# Patient Record
Sex: FEMALE | Race: WHITE | URBAN - METROPOLITAN AREA
[De-identification: names, ages, dates, MRNs, and addresses within clinical notes are randomized per-mention and may not be internally consistent; named-entity substitution may affect disease eponyms.]

---

## 2017-02-15 ENCOUNTER — TRANSFERRED RECORDS (OUTPATIENT)
Dept: HEALTH INFORMATION MANAGEMENT | Facility: CLINIC | Age: 32
End: 2017-02-15

## 2017-02-16 ENCOUNTER — TRANSFERRED RECORDS (OUTPATIENT)
Dept: HEALTH INFORMATION MANAGEMENT | Facility: CLINIC | Age: 32
End: 2017-02-16

## 2017-02-17 ENCOUNTER — TRANSFERRED RECORDS (OUTPATIENT)
Dept: HEALTH INFORMATION MANAGEMENT | Facility: CLINIC | Age: 32
End: 2017-02-17

## 2017-02-23 ENCOUNTER — ONCOLOGY VISIT (OUTPATIENT)
Dept: ONCOLOGY | Facility: CLINIC | Age: 32
End: 2017-02-23
Attending: OBSTETRICS & GYNECOLOGY
Payer: COMMERCIAL

## 2017-02-23 VITALS
RESPIRATION RATE: 17 BRPM | WEIGHT: 146.4 LBS | SYSTOLIC BLOOD PRESSURE: 122 MMHG | BODY MASS INDEX: 24.39 KG/M2 | OXYGEN SATURATION: 100 % | HEART RATE: 77 BPM | HEIGHT: 65 IN | TEMPERATURE: 98.1 F | DIASTOLIC BLOOD PRESSURE: 83 MMHG

## 2017-02-23 DIAGNOSIS — Z01.818 PRE-OPERATIVE EXAMINATION: ICD-10-CM

## 2017-02-23 DIAGNOSIS — R19.00 PELVIC MASS: ICD-10-CM

## 2017-02-23 DIAGNOSIS — Z01.818 PRE-OPERATIVE EXAMINATION: Primary | ICD-10-CM

## 2017-02-23 LAB
ALBUMIN SERPL-MCNC: 3.9 G/DL (ref 3.4–5)
ALP SERPL-CCNC: 71 U/L (ref 40–150)
ALT SERPL W P-5'-P-CCNC: 47 U/L (ref 0–50)
ANION GAP SERPL CALCULATED.3IONS-SCNC: 8 MMOL/L (ref 3–14)
AST SERPL W P-5'-P-CCNC: 49 U/L (ref 0–45)
BASOPHILS # BLD AUTO: 0 10E9/L (ref 0–0.2)
BASOPHILS NFR BLD AUTO: 0.7 %
BILIRUB SERPL-MCNC: 0.2 MG/DL (ref 0.2–1.3)
BUN SERPL-MCNC: 9 MG/DL (ref 7–30)
CALCIUM SERPL-MCNC: 9.6 MG/DL (ref 8.5–10.1)
CANCER AG125 SERPL-ACNC: 84 U/ML (ref 0–30)
CHLORIDE SERPL-SCNC: 107 MMOL/L (ref 94–109)
CO2 SERPL-SCNC: 26 MMOL/L (ref 20–32)
CREAT SERPL-MCNC: 0.74 MG/DL (ref 0.52–1.04)
DIFFERENTIAL METHOD BLD: ABNORMAL
EOSINOPHIL # BLD AUTO: 0.1 10E9/L (ref 0–0.7)
EOSINOPHIL NFR BLD AUTO: 1.3 %
ERYTHROCYTE [DISTWIDTH] IN BLOOD BY AUTOMATED COUNT: 16.1 % (ref 10–15)
GFR SERPL CREATININE-BSD FRML MDRD: ABNORMAL ML/MIN/1.7M2
GLUCOSE SERPL-MCNC: 72 MG/DL (ref 70–99)
HCT VFR BLD AUTO: 36.8 % (ref 35–47)
HGB BLD-MCNC: 12.3 G/DL (ref 11.7–15.7)
IMM GRANULOCYTES # BLD: 0 10E9/L (ref 0–0.4)
IMM GRANULOCYTES NFR BLD: 0.5 %
LYMPHOCYTES # BLD AUTO: 1.1 10E9/L (ref 0.8–5.3)
LYMPHOCYTES NFR BLD AUTO: 19.7 %
MCH RBC QN AUTO: 27.3 PG (ref 26.5–33)
MCHC RBC AUTO-ENTMCNC: 33.4 G/DL (ref 31.5–36.5)
MCV RBC AUTO: 82 FL (ref 78–100)
MONOCYTES # BLD AUTO: 0.4 10E9/L (ref 0–1.3)
MONOCYTES NFR BLD AUTO: 7 %
NEUTROPHILS # BLD AUTO: 3.9 10E9/L (ref 1.6–8.3)
NEUTROPHILS NFR BLD AUTO: 70.8 %
NRBC # BLD AUTO: 0 10*3/UL
NRBC BLD AUTO-RTO: 0 /100
PLATELET # BLD AUTO: 279 10E9/L (ref 150–450)
POTASSIUM SERPL-SCNC: 4 MMOL/L (ref 3.4–5.3)
PROT SERPL-MCNC: 8 G/DL (ref 6.8–8.8)
RBC # BLD AUTO: 4.5 10E12/L (ref 3.8–5.2)
SODIUM SERPL-SCNC: 141 MMOL/L (ref 133–144)
WBC # BLD AUTO: 5.5 10E9/L (ref 4–11)

## 2017-02-23 PROCEDURE — 99205 OFFICE O/P NEW HI 60 MIN: CPT | Mod: ZP | Performed by: OBSTETRICS & GYNECOLOGY

## 2017-02-23 PROCEDURE — 86304 IMMUNOASSAY TUMOR CA 125: CPT | Performed by: OBSTETRICS & GYNECOLOGY

## 2017-02-23 PROCEDURE — 99212 OFFICE O/P EST SF 10 MIN: CPT | Mod: ZF

## 2017-02-23 PROCEDURE — 80053 COMPREHEN METABOLIC PANEL: CPT | Performed by: OBSTETRICS & GYNECOLOGY

## 2017-02-23 PROCEDURE — 36415 COLL VENOUS BLD VENIPUNCTURE: CPT | Performed by: OBSTETRICS & GYNECOLOGY

## 2017-02-23 PROCEDURE — 85025 COMPLETE CBC W/AUTO DIFF WBC: CPT | Performed by: OBSTETRICS & GYNECOLOGY

## 2017-02-23 RX ORDER — PHENAZOPYRIDINE HYDROCHLORIDE 200 MG/1
200 TABLET, FILM COATED ORAL ONCE
Status: CANCELLED | OUTPATIENT
Start: 2017-02-23 | End: 2017-02-23

## 2017-02-23 RX ORDER — NORETHINDRONE ACETATE AND ETHINYL ESTRADIOL 1MG-20(21)
KIT ORAL
COMMUNITY
Start: 2017-02-17

## 2017-02-23 RX ORDER — IMIQUIMOD 12.5 MG/.25G
CREAM TOPICAL
COMMUNITY
Start: 2017-02-17

## 2017-02-23 RX ORDER — ACETAMINOPHEN 325 MG/1
975 TABLET ORAL ONCE
Status: CANCELLED | OUTPATIENT
Start: 2017-02-23 | End: 2017-02-23

## 2017-02-23 ASSESSMENT — PAIN SCALES - GENERAL: PAINLEVEL: NO PAIN (0)

## 2017-02-23 NOTE — PROGRESS NOTES
"                        Consult Notes on Referred Patient    Date: 2017       Dr. Shira Carranza MD, MD  [address] [address]       RE: Paula Esparza  : 1985  HADLEY: 2017     Dear Dr. Shira Carranza MD:    I had the pleasure of seeing your patient Paula Esparza here at the Gynecologic Cancer Clinic at the [facility] on 2017.  As you know she is a very pleasant 31 year old woman with a recent diagnosis of pelvic mass.  Given these findings she was subsequently sent to the Gynecologic Cancer Clinic for new patient consultation.     The patient states she was seen at Urgent care on 2/15/17 for concerns of progressively worsening LUQ abdominal pain over the week prior.  She states she was working a lot of hours (as a ) and thought it was secondary to a muscle strain and/or constipation.  At Urgent care, they told her it appeared she was constipation, however, noted a very large abdominal mass.  She was subsequently referred to [facility] for further evaluation.  There, she had CT scan and laboratory testing completed which was notable for an elevated CA-125 an 24 cm pelvic mass extending to the upper abdomen. She followed up with benign OB/GYN at which time pap smear was obtained then subsequently referred to us.  Today the patient reports minimal abdominal pressure but no pain.  States she otherwise feels well and at her baseline.     OB History:       GYN History:   LMP: 17  Menses: \"pretty regular\". Lasts 4 days, normal flow. Minimal to moderate cramping.   Sexual Activity: Not currently.  Male partners only.   Contraception: Denies   STI: Denies   Hx of genital warts; has used Aldara in the past.  Got a new Rx but has not filled it.     Brief History:  2017: CT for abdominal pain (LUQ)  Impression:   1. Massive 24.8 x 15.3 x 24.3 cm unilocular fluid-filled lesion extending   from the pelvis into the superior abdomen, with an " single peripheral   enhancing mural nodule. 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 Relative hyperintensity of the   internal fluid may represent proteinaceous fluid, debris, or blood   products; no active hemorrhage is identified.   2. 3.1 x 7.1 x 7.3 well circumscribed fat containing lesion along the   left/superior aspect of the lesion, may represent a co-existent teratoma   that has been displaced out of the pelvis as a result of the ovarian   Lesion.  CA-125 on 2/17/17: 123     Review of Systems:  Systemic           No weight changes; no night sweats; no appetite changes (inconsistent at baseline)  Skin           No rashes, or lesions  Eye           no changes in vision  Joaquina-Laryngeal           no hoarseness   Pulmonary    no cough; no shortness of breath  Cardiovascular    no chest pain  Gastrointestinal    no diarrhea; no constipation;  no blood in stool  Genitourinary   urinary frequency (10-15 times day); no urinary urgency; no dysuria; no pain; no abnormal vaginal discharge; no abnormal vaginal bleeding  Breast    no breast changes; no breast pain  Musculoskeletal    no myalgias; no arthralgias  Psychiatric           depressed mood (denies every being on medication); no anxiety    Hematologic           no noticeable swellings or lumps   Endocrine    no hot flashes; no heat/cold intolerance         Neurological   no numbness and tingling    Past Medical History:  Past Medical History   Diagnosis Date     Depression      Not formally diagnosed     Past Surgical History:  History reviewed. No pertinent past surgical history.    Health Maintenance:  Health Maintenance Due   Topic Date Due     TETANUS IMMUNIZATION (SYSTEM ASSIGNED)  08/02/2005     PAP SCREENING Q3 YR (SYSTEM ASSIGNED)  08/02/2008     INFLUENZA VACCINE (SYSTEM ASSIGNED)  09/01/2016     Last Pap Smear: 02/2017   Result: ASC-US, unable to see HPV results in Care Everywhere  She has not had a history of  "abnormal Pap smears in the past.    Last Mammogram: Denies  Last Colonoscopy: Denies  Last DEXA Scan: Denies                Last Diabetes Screening:  Denies   Last Thyroid Screening:   Denies   Last Cholest Screening:  Denies     Current Medications:   has a current medication list which includes the following prescription(s): imiquimod and norethindrone-ethinyl estradiol.     Allergies:   No Known Allergies      Social History:  Lives with roommate.  Works at [employer] as a .  Current tobacco use is 1 PPD for past 7 years.  ETOH use: drinks 5-7 beverages, patient aware that she drinks too much. Denies illicit drug use.     Family History:   Family History   Problem Relation Age of Onset     CANCER Mother      Tongue      Coronary Artery Disease Early Onset Father      DIABETES Father      Breast Cancer Maternal Grandmother      Survivor     Coronary Artery Disease Maternal Grandmother      Breast Cancer Paternal Grandmother      Suvivor    Sister with ovarian cysts      Physical Exam:   /83 (BP Location: Right arm, Patient Position: Chair, Cuff Size: Adult Regular)  Pulse 77  Temp 98.1  F (36.7  C) (Oral)  Resp 17  Ht 1.638 m (5' 4.5\")  Wt 66.4 kg (146 lb 6.4 oz)  LMP 02/21/2017  SpO2 100%  Breastfeeding? No  BMI 24.74 kg/m2  Body mass index is 24.74 kg/(m^2).    General Appearance: healthy and alert, no distress     HEENT:  no thyromegaly, no palpable nodules or masses        Cardiovascular: regular rate and rhythm, no gallops, rubs or murmurs     Respiratory: lungs clear, no rales, rhonchi or wheezes, normal diaphragmatic excursion    Musculoskeletal:  extremities non tender and without edema    Skin: no lesions or rashes     Neurological: normal gait, no gross defects     Psychiatric: appropriate mood and affect                               Hematological: normal cervical, supraclavicular and inguinal lymph nodes     Gastrointestinal:     abdomen soft, non-tender. Palpable fullness above umbilicus " to right side of abdomen.    Genitourinary: External genitalia and urethral meatus appears normal except for areas along right introitus and left consistent with condyloma. Vagina is smooth without nodularity or masses.  Cervix appears normal and without lesions.  Menstruating currently. Bimanual exam reveals small midline uterus, fullness on rectovaginal exam, no nodularity or fullness.  Recto-vaginal exam confirms these findings.      Assessment:  Paula Esparza is a 31 year old woman with a new diagnosis of large 25 cm pelvic mass, concern for ovarian etiology with CA-125 of 123.         Plan:     1.)   Pelvic mass.  Discussed recommendations for surgical resection by way of exploratory laparotomy, Topel of mass, oophorectomy, possible pelvic and paraaortic lymph node dissection, possible cancer staging.  Also discussed pending HPV results on her most recent ASC-US pap smear, recommendations for possible colposcopy, possible cervical biopsies.       2.) Genetic risk factors were assessed and the patient does not meet the qualifications for a referral.      3.) Labs and/or tests ordered include:  CMP, CBC.  Need to follow up on HPV testing (from pap smear obtained on 2/17/17) at [facility] to determine need for colposcopy with biopsies given ASC-US pap.      4.) Health maintenance issues addressed today include Depression and ETOH abuse.  SW consulted to assist with resource management after patient was amenable and open to further discussion.     5.) Pre-op teaching was completed today.  Risks of surgery were discussed to include: bleeding, transfusion, infection, unintentional injury to surrounding organs/structures.      Thank you for allowing us to participate in the care of your patient.         Sincerely,    Haley Fernandez MD    Department of Ob/Gyn and Women's Health  Division of Gynecologic Oncology  [facility]  [phone]      CC  No care team member to  LIOR Vivas MD

## 2017-02-23 NOTE — LETTER
"2017       RE: Paula Esparza  [address] [facility] [address]     Dear Colleague,    Thank you for referring your patient, Paula Esparza, to the [facility]. Please see a copy of my visit note below.                            Consult Notes on Referred Patient    Date: 2017       Dr. Shira Carranza MD, MD [facility]  [address] [place] [address]       RE: Paula Esparza  : 1985  HADLEY: 2017     Dear Dr. Shira Carranza MD:    I had the pleasure of seeing your patient Paula Esparza here at the Gynecologic Cancer Clinic at the [facility] on 2017.  As you know she is a very pleasant 31 year old woman with a recent diagnosis of pelvic mass.  Given these findings she was subsequently sent to the Gynecologic Cancer Clinic for new patient consultation.     The patient states she was seen at Urgent care on 2/15/17 for concerns of progressively worsening LUQ abdominal pain over the week prior.  She states she was working a lot of hours (as a ) and thought it was secondary to a muscle strain and/or constipation.  At Urgent care, they told her it appeared she was constipation, however, noted a very large abdominal mass.  She was subsequently referred to [facility] for further evaluation.  There, she had CT scan and laboratory testing completed which was notable for an elevated CA-125 an 24 cm pelvic mass extending to the upper abdomen. She followed up with benign OB/GYN at which time pap smear was obtained then subsequently referred to us.  Today the patient reports minimal abdominal pressure but no pain.  States she otherwise feels well and at her baseline.     OB History:       GYN History:   LMP: 17  Menses: \"pretty regular\". Lasts 4 days, normal flow. Minimal to moderate cramping.   Sexual Activity: Not currently.  Male partners only.   Contraception: Denies   STI: Denies   Hx of genital warts; has used Aldara in the past.  " Got a new Rx but has not filled it.     Brief History:  2/2017: CT for abdominal pain (LUQ)  Impression:   1. Massive 24.8 x 15.3 x 24.3 cm unilocular fluid-filled lesion extending   from the pelvis into the superior abdomen, with an single peripheral   enhancing mural nodule. 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 Relative hyperintensity of the   internal fluid may represent proteinaceous fluid, debris, or blood   products; no active hemorrhage is identified.   2. 3.1 x 7.1 x 7.3 well circumscribed fat containing lesion along the   left/superior aspect of the lesion, may represent a co-existent teratoma   that has been displaced out of the pelvis as a result of the ovarian   Lesion.  CA-125 on 2/17/17: 123     Review of Systems:  Systemic           No weight changes; no night sweats; no appetite changes (inconsistent at baseline)  Skin           No rashes, or lesions  Eye           no changes in vision  Joaquina-Laryngeal           no hoarseness   Pulmonary    no cough; no shortness of breath  Cardiovascular    no chest pain  Gastrointestinal    no diarrhea; no constipation;  no blood in stool  Genitourinary   urinary frequency (10-15 times day); no urinary urgency; no dysuria; no pain; no abnormal vaginal discharge; no abnormal vaginal bleeding  Breast    no breast changes; no breast pain  Musculoskeletal    no myalgias; no arthralgias  Psychiatric           depressed mood (denies every being on medication); no anxiety    Hematologic           no noticeable swellings or lumps   Endocrine    no hot flashes; no heat/cold intolerance         Neurological   no numbness and tingling    Past Medical History:  Past Medical History   Diagnosis Date     Depression      Not formally diagnosed     Past Surgical History:  History reviewed. No pertinent past surgical history.    Health Maintenance:  Health Maintenance Due   Topic Date Due     TETANUS IMMUNIZATION (SYSTEM ASSIGNED)   "08/02/2005     PAP SCREENING Q3 YR (SYSTEM ASSIGNED)  08/02/2008     INFLUENZA VACCINE (SYSTEM ASSIGNED)  09/01/2016     Last Pap Smear: 02/2017   Result: ASC-US, unable to see HPV results in Care Everywhere  She has not had a history of abnormal Pap smears in the past.    Last Mammogram: Denies  Last Colonoscopy: Denies  Last DEXA Scan: Denies                Last Diabetes Screening:  Denies   Last Thyroid Screening:   Denies   Last Cholest Screening:  Denies     Current Medications:   has a current medication list which includes the following prescription(s): imiquimod and norethindrone-ethinyl estradiol.     Allergies:   No Known Allergies      Social History:  Lives with roommate.  Works at [employer] as a .  Current tobacco use is 1 PPD for past 7 years.  ETOH use: drinks 5-7 beverages, patient aware that she drinks too much. Denies illicit drug use.     Family History:   Family History   Problem Relation Age of Onset     CANCER Mother      Tongue      Coronary Artery Disease Early Onset Father      DIABETES Father      Breast Cancer Maternal Grandmother      Survivor     Coronary Artery Disease Maternal Grandmother      Breast Cancer Paternal Grandmother      Suvivor    Sister with ovarian cysts      Physical Exam:   /83 (BP Location: Right arm, Patient Position: Chair, Cuff Size: Adult Regular)  Pulse 77  Temp 98.1  F (36.7  C) (Oral)  Resp 17  Ht 1.638 m (5' 4.5\")  Wt 66.4 kg (146 lb 6.4 oz)  LMP 02/21/2017  SpO2 100%  Breastfeeding? No  BMI 24.74 kg/m2  Body mass index is 24.74 kg/(m^2).    General Appearance: healthy and alert, no distress     HEENT:  no thyromegaly, no palpable nodules or masses        Cardiovascular: regular rate and rhythm, no gallops, rubs or murmurs     Respiratory: lungs clear, no rales, rhonchi or wheezes, normal diaphragmatic excursion    Musculoskeletal:  extremities non tender and without edema    Skin: no lesions or rashes     Neurological: normal gait, no gross " defects     Psychiatric: appropriate mood and affect                               Hematological: normal cervical, supraclavicular and inguinal lymph nodes     Gastrointestinal:     abdomen soft, non-tender. Palpable fullness above umbilicus to right side of abdomen.    Genitourinary: External genitalia and urethral meatus appears normal except for areas along right introitus and left consistent with condyloma. Vagina is smooth without nodularity or masses.  Cervix appears normal and without lesions.  Menstruating currently. Bimanual exam reveals small midline uterus, fullness on rectovaginal exam, no nodularity or fullness.  Recto-vaginal exam confirms these findings.      Assessment:  Paula Esparza is a 31 year old woman with a new diagnosis of large 25 cm pelvic mass, concern for ovarian etiology with CA-125 of 123.         Plan:     1.)   Pelvic mass.  Discussed recommendations for surgical resection by way of exploratory laparotomy, Topel of mass, oophorectomy, possible pelvic and paraaortic lymph node dissection, possible cancer staging.  Also discussed pending HPV results on her most recent ASC-US pap smear, recommendations for possible colposcopy, possible cervical biopsies.       2.) Genetic risk factors were assessed and the patient does not meet the qualifications for a referral.      3.) Labs and/or tests ordered include:  CMP, CBC.  Need to follow up on HPV testing (from pap smear obtained on 2/17/17) at [facility] [place] [place] to determine need for colposcopy with biopsies given ASC-US pap.      4.) Health maintenance issues addressed today include Depression and ETOH abuse.  SW consulted to assist with resource management after patient was amenable and open to further discussion.     5.) Pre-op teaching was completed today.  Risks of surgery were discussed to include: bleeding, transfusion, infection, unintentional injury to surrounding organs/structures.      Thank you for allowing us to participate in the care of your  patient.         Sincerely,    Haley Fernandez MD    Department of Ob/Gyn and Women's Health  Division of Gynecologic Oncology  [facility]  [phone]      CC  No care team member to LIOR Vivas MD

## 2017-02-23 NOTE — MR AVS SNAPSHOT
"              After Visit Summary   2/23/2017    Paula Esparza    MRN: [id number]           Patient Information     Date Of Birth          1985        Visit Information        Provider Department      2/23/2017 10:00 Haley Daily MD [facility]        Today's Diagnoses     Pre-operative examination    -  1    Pelvic mass           Follow-ups after your visit        Your next 10 appointments already scheduled     Mar 01, 2017   Procedure with Haley Fernandez MD   [facility] [place], Same Day Surgery (--)    [address] [address] [phone]            Mar 30, 2017  2:00 PM CDT   (Arrive by 1:45 PM)   Post-Op with Haley Fernandez MD   [facility])    [address] [facility] [address] [phone]              Who to contact     If you have questions or need follow up information about today's clinic visit or your schedule please contact [facility] directly at [phone].  Normal or non-critical lab and imaging results will be communicated to you by [employer], letter or phone within 4 business days after the clinic has received the results. If you do not hear from us within 7 days, please contact the clinic through [employer] or phone. If you have a critical or abnormal lab result, we will notify you by phone as soon as possible.  Submit refill requests through [employer] or call your pharmacy and they will forward the refill request to us. Please allow 3 business days for your refill to be completed.          Additional Information About Your Visit        [employer] Information     [employer] lets you send messages to your doctor, view your test results, renew your prescriptions, schedule appointments and more. To sign up, go to [email or web address] . Click on \"Log in\" on the left side of the screen, which will take you to the Welcome page. Then click on \"Sign up Now\" on " "the right side of the page.     You will be asked to enter the access code listed below, as well as some personal information. Please follow the directions to create your username and password.     Your access code is: [email or web address]  Expires: 2017  1:49 PM     Your access code will  in 90 days. If you need help or a new code, please call your [facility] or [phone].        Care EveryWhere ID     This is your Care EveryWhere ID. This could be used by other organizations to access your [facility] medical records  [id number]        Your Vitals Were     Pulse Temperature Respirations Height Last Period Pulse Oximetry    77 98.1  F (36.7  C) (Oral) 17 1.638 m (5' 4.5\") 2017 100%    Breastfeeding? BMI (Body Mass Index)                No 24.74 kg/m2           Blood Pressure from Last 3 Encounters:   17 122/83    Weight from Last 3 Encounters:   17 66.4 kg (146 lb 6.4 oz)              We Performed the Following     Kassandra-Operative Worksheet - Exploratory Laparotomy Total Abdominal Hysterectomy, bilateral Salpingo-Oophorectomy, tumor debulking, omentectomy, possible intraperitoneal port placement        Primary Care Provider    None Specified       No primary provider on file.        Thank you!     Thank you for choosing [facility]CER [facility]  for your care. Our goal is always to provide you with excellent care. Hearing back from our patients is one way we can continue to improve our services. Please take a few minutes to complete the written survey that you may receive in the mail after your visit with us. Thank you!             Your Updated Medication List - Protect others around you: Learn how to safely use, store and throw away your medicines at www.disposemymeds.org.          This list is accurate as of: 17 11:59 PM.  Always use your most recent med list.                   Brand Name Dispense Instructions for use    imiquimod 5 % cream    ALDARA     Apply to affected " areas qOD x 6 weeks.       norethindrone-ethinyl estradiol 1-20 MG-MCG per tablet    JUNEL FE 1/20

## 2017-02-23 NOTE — NURSING NOTE
"Paula Esparza is a 31 year old female who presents for:  Chief Complaint   Patient presents with     Oncology Clinic Visit     new patient consultation related to ovary mass         Initial Vitals:  /83 (BP Location: Right arm, Patient Position: Chair, Cuff Size: Adult Regular)  Pulse 77  Temp 98.1  F (36.7  C) (Oral)  Resp 17  Ht 1.638 m (5' 4.5\")  Wt 66.4 kg (146 lb 6.4 oz)  LMP 02/21/2017  SpO2 100%  Breastfeeding? No  BMI 24.74 kg/m2 Estimated body mass index is 24.74 kg/(m^2) as calculated from the following:    Height as of this encounter: 1.638 m (5' 4.5\").    Weight as of this encounter: 66.4 kg (146 lb 6.4 oz).. Body surface area is 1.74 meters squared. BP completed using cuff size: regular  No Pain (0) Patient's last menstrual period was 02/21/2017. Allergies and medications reviewed.     Medications: Medication refills not needed today.  Pharmacy name entered into EPIC: Data Unavailable    Comments: patient denied pain/discomfort.     5 minutes for nursing intake (face to face time)   Hua Albrecht CMA        "

## 2017-02-28 ENCOUNTER — ANESTHESIA EVENT (OUTPATIENT)
Dept: SURGERY | Facility: CLINIC | Age: 32
DRG: 743 | End: 2017-02-28
Payer: COMMERCIAL

## 2017-02-28 ASSESSMENT — ACTIVITIES OF DAILY LIVING (ADL): COGNITION: 0 - NO COGNITION ISSUES REPORTED

## 2017-03-01 ENCOUNTER — ANESTHESIA (OUTPATIENT)
Dept: SURGERY | Facility: CLINIC | Age: 32
DRG: 743 | End: 2017-03-01
Payer: COMMERCIAL

## 2017-03-01 ENCOUNTER — HOSPITAL ENCOUNTER (INPATIENT)
Facility: CLINIC | Age: 32
LOS: 2 days | Discharge: HOME | DRG: 743 | End: 2017-03-03
Attending: OBSTETRICS & GYNECOLOGY | Admitting: OBSTETRICS & GYNECOLOGY
Payer: COMMERCIAL

## 2017-03-01 DIAGNOSIS — D36.9 DERMOID CYST: Primary | ICD-10-CM

## 2017-03-01 DIAGNOSIS — R19.00 PELVIC MASS: ICD-10-CM

## 2017-03-01 DIAGNOSIS — Z01.818 PRE-OPERATIVE EXAMINATION: ICD-10-CM

## 2017-03-01 DIAGNOSIS — G89.18 ACUTE POST-OPERATIVE PAIN: ICD-10-CM

## 2017-03-01 LAB
ABO + RH BLD: NORMAL
ABO + RH BLD: NORMAL
BLD GP AB SCN SERPL QL: NORMAL
BLOOD BANK CMNT PATIENT-IMP: NORMAL
HCG UR QL: NEGATIVE
SPECIMEN EXP DATE BLD: NORMAL

## 2017-03-01 PROCEDURE — 0UT50ZZ RESECTION OF RIGHT FALLOPIAN TUBE, OPEN APPROACH: ICD-10-PCS | Performed by: OBSTETRICS & GYNECOLOGY

## 2017-03-01 PROCEDURE — 25000125 ZZHC RX 250: Performed by: ANESTHESIOLOGY

## 2017-03-01 PROCEDURE — 25000125 ZZHC RX 250: Performed by: NURSE ANESTHETIST, CERTIFIED REGISTERED

## 2017-03-01 PROCEDURE — 88112 CYTOPATH CELL ENHANCE TECH: CPT | Performed by: OBSTETRICS & GYNECOLOGY

## 2017-03-01 PROCEDURE — 00000155 ZZHCL STATISTIC H-CELL BLOCK W/STAIN: Performed by: OBSTETRICS & GYNECOLOGY

## 2017-03-01 PROCEDURE — 36000053 ZZH SURGERY LEVEL 2 EA 15 ADDTL MIN - UMMC: Performed by: OBSTETRICS & GYNECOLOGY

## 2017-03-01 PROCEDURE — 88305 TISSUE EXAM BY PATHOLOGIST: CPT | Performed by: OBSTETRICS & GYNECOLOGY

## 2017-03-01 PROCEDURE — 25000128 H RX IP 250 OP 636: Performed by: NURSE ANESTHETIST, CERTIFIED REGISTERED

## 2017-03-01 PROCEDURE — 86900 BLOOD TYPING SEROLOGIC ABO: CPT | Performed by: OBSTETRICS & GYNECOLOGY

## 2017-03-01 PROCEDURE — 25000132 ZZH RX MED GY IP 250 OP 250 PS 637: Performed by: OBSTETRICS & GYNECOLOGY

## 2017-03-01 PROCEDURE — 25800025 ZZH RX 258: Performed by: OBSTETRICS & GYNECOLOGY

## 2017-03-01 PROCEDURE — 81025 URINE PREGNANCY TEST: CPT | Performed by: OBSTETRICS & GYNECOLOGY

## 2017-03-01 PROCEDURE — 40000014 ZZH STATISTIC ARTERIAL MONITORING DAILY

## 2017-03-01 PROCEDURE — 25000128 H RX IP 250 OP 636: Performed by: ANESTHESIOLOGY

## 2017-03-01 PROCEDURE — 36000051 ZZH SURGERY LEVEL 2 1ST 30 MIN - UMMC: Performed by: OBSTETRICS & GYNECOLOGY

## 2017-03-01 PROCEDURE — 88307 TISSUE EXAM BY PATHOLOGIST: CPT | Performed by: OBSTETRICS & GYNECOLOGY

## 2017-03-01 PROCEDURE — 27210794 ZZH OR GENERAL SUPPLY STERILE: Performed by: OBSTETRICS & GYNECOLOGY

## 2017-03-01 PROCEDURE — 12000025 ZZH R&B TRANSPLANT INTERMEDIATE

## 2017-03-01 PROCEDURE — 88302 TISSUE EXAM BY PATHOLOGIST: CPT | Performed by: OBSTETRICS & GYNECOLOGY

## 2017-03-01 PROCEDURE — 86850 RBC ANTIBODY SCREEN: CPT | Performed by: OBSTETRICS & GYNECOLOGY

## 2017-03-01 PROCEDURE — C9399 UNCLASSIFIED DRUGS OR BIOLOG: HCPCS | Performed by: NURSE ANESTHETIST, CERTIFIED REGISTERED

## 2017-03-01 PROCEDURE — 25000125 ZZHC RX 250: Performed by: OBSTETRICS & GYNECOLOGY

## 2017-03-01 PROCEDURE — 37000008 ZZH ANESTHESIA TECHNICAL FEE, 1ST 30 MIN: Performed by: OBSTETRICS & GYNECOLOGY

## 2017-03-01 PROCEDURE — 40000170 ZZH STATISTIC PRE-PROCEDURE ASSESSMENT II: Performed by: OBSTETRICS & GYNECOLOGY

## 2017-03-01 PROCEDURE — 25800025 ZZH RX 258: Performed by: NURSE ANESTHETIST, CERTIFIED REGISTERED

## 2017-03-01 PROCEDURE — 0UDB8ZZ EXTRACTION OF ENDOMETRIUM, VIA NATURAL OR ARTIFICIAL OPENING ENDOSCOPIC: ICD-10-PCS | Performed by: OBSTETRICS & GYNECOLOGY

## 2017-03-01 PROCEDURE — 71000014 ZZH RECOVERY PHASE 1 LEVEL 2 FIRST HR: Performed by: OBSTETRICS & GYNECOLOGY

## 2017-03-01 PROCEDURE — P9041 ALBUMIN (HUMAN),5%, 50ML: HCPCS | Performed by: NURSE ANESTHETIST, CERTIFIED REGISTERED

## 2017-03-01 PROCEDURE — 0UT00ZZ RESECTION OF RIGHT OVARY, OPEN APPROACH: ICD-10-PCS | Performed by: OBSTETRICS & GYNECOLOGY

## 2017-03-01 PROCEDURE — 37000009 ZZH ANESTHESIA TECHNICAL FEE, EACH ADDTL 15 MIN: Performed by: OBSTETRICS & GYNECOLOGY

## 2017-03-01 PROCEDURE — 71000015 ZZH RECOVERY PHASE 1 LEVEL 2 EA ADDTL HR: Performed by: OBSTETRICS & GYNECOLOGY

## 2017-03-01 PROCEDURE — 25000128 H RX IP 250 OP 636: Performed by: OBSTETRICS & GYNECOLOGY

## 2017-03-01 PROCEDURE — S0020 INJECTION, BUPIVICAINE HYDRO: HCPCS | Performed by: ANESTHESIOLOGY

## 2017-03-01 PROCEDURE — 25000131 ZZH RX MED GY IP 250 OP 636 PS 637: Performed by: ANESTHESIOLOGY

## 2017-03-01 PROCEDURE — 88331 PATH CONSLTJ SURG 1 BLK 1SPC: CPT | Performed by: OBSTETRICS & GYNECOLOGY

## 2017-03-01 PROCEDURE — 25000565 ZZH ISOFLURANE, EA 15 MIN: Performed by: OBSTETRICS & GYNECOLOGY

## 2017-03-01 PROCEDURE — 86901 BLOOD TYPING SEROLOGIC RH(D): CPT | Performed by: OBSTETRICS & GYNECOLOGY

## 2017-03-01 PROCEDURE — 0UBC8ZX EXCISION OF CERVIX, VIA NATURAL OR ARTIFICIAL OPENING ENDOSCOPIC, DIAGNOSTIC: ICD-10-PCS | Performed by: OBSTETRICS & GYNECOLOGY

## 2017-03-01 PROCEDURE — 36415 COLL VENOUS BLD VENIPUNCTURE: CPT | Performed by: OBSTETRICS & GYNECOLOGY

## 2017-03-01 PROCEDURE — 40000275 ZZH STATISTIC RCP TIME EA 10 MIN

## 2017-03-01 RX ORDER — FLUMAZENIL 0.1 MG/ML
0.2 INJECTION, SOLUTION INTRAVENOUS
Status: DISCONTINUED | OUTPATIENT
Start: 2017-03-01 | End: 2017-03-01 | Stop reason: HOSPADM

## 2017-03-01 RX ORDER — HEPARIN SODIUM 5000 [USP'U]/.5ML
5000 INJECTION, SOLUTION INTRAVENOUS; SUBCUTANEOUS ONCE
Status: COMPLETED | OUTPATIENT
Start: 2017-03-01 | End: 2017-03-01

## 2017-03-01 RX ORDER — SCOLOPAMINE TRANSDERMAL SYSTEM 1 MG/1
1 PATCH, EXTENDED RELEASE TRANSDERMAL ONCE
Status: DISCONTINUED | OUTPATIENT
Start: 2017-03-01 | End: 2017-03-01 | Stop reason: HOSPADM

## 2017-03-01 RX ORDER — HYDROXYZINE HYDROCHLORIDE 25 MG/1
25 TABLET, FILM COATED ORAL EVERY 6 HOURS PRN
Status: DISCONTINUED | OUTPATIENT
Start: 2017-03-01 | End: 2017-03-03 | Stop reason: HOSPADM

## 2017-03-01 RX ORDER — FENTANYL CITRATE 50 UG/ML
25-50 INJECTION, SOLUTION INTRAMUSCULAR; INTRAVENOUS
Status: DISCONTINUED | OUTPATIENT
Start: 2017-03-01 | End: 2017-03-01 | Stop reason: HOSPADM

## 2017-03-01 RX ORDER — ONDANSETRON 2 MG/ML
4 INJECTION INTRAMUSCULAR; INTRAVENOUS EVERY 30 MIN PRN
Status: DISCONTINUED | OUTPATIENT
Start: 2017-03-01 | End: 2017-03-01 | Stop reason: HOSPADM

## 2017-03-01 RX ORDER — HYDROMORPHONE HYDROCHLORIDE 1 MG/ML
.3-.5 INJECTION, SOLUTION INTRAMUSCULAR; INTRAVENOUS; SUBCUTANEOUS
Status: DISCONTINUED | OUTPATIENT
Start: 2017-03-01 | End: 2017-03-02

## 2017-03-01 RX ORDER — ACETAMINOPHEN 325 MG/1
650 TABLET ORAL EVERY 6 HOURS
Status: DISCONTINUED | OUTPATIENT
Start: 2017-03-01 | End: 2017-03-01

## 2017-03-01 RX ORDER — BISACODYL 10 MG
10 SUPPOSITORY, RECTAL RECTAL DAILY PRN
Status: DISCONTINUED | OUTPATIENT
Start: 2017-03-01 | End: 2017-03-02

## 2017-03-01 RX ORDER — LIDOCAINE HYDROCHLORIDE 20 MG/ML
INJECTION, SOLUTION INFILTRATION; PERINEURAL PRN
Status: DISCONTINUED | OUTPATIENT
Start: 2017-03-01 | End: 2017-03-01

## 2017-03-01 RX ORDER — FENTANYL CITRATE 50 UG/ML
INJECTION, SOLUTION INTRAMUSCULAR; INTRAVENOUS PRN
Status: DISCONTINUED | OUTPATIENT
Start: 2017-03-01 | End: 2017-03-01

## 2017-03-01 RX ORDER — SODIUM CHLORIDE, SODIUM LACTATE, POTASSIUM CHLORIDE, CALCIUM CHLORIDE 600; 310; 30; 20 MG/100ML; MG/100ML; MG/100ML; MG/100ML
INJECTION, SOLUTION INTRAVENOUS CONTINUOUS PRN
Status: DISCONTINUED | OUTPATIENT
Start: 2017-03-01 | End: 2017-03-01

## 2017-03-01 RX ORDER — ONDANSETRON 4 MG/1
4 TABLET, ORALLY DISINTEGRATING ORAL EVERY 30 MIN PRN
Status: DISCONTINUED | OUTPATIENT
Start: 2017-03-01 | End: 2017-03-01 | Stop reason: HOSPADM

## 2017-03-01 RX ORDER — HYDROMORPHONE HYDROCHLORIDE 1 MG/ML
.3-.5 INJECTION, SOLUTION INTRAMUSCULAR; INTRAVENOUS; SUBCUTANEOUS EVERY 10 MIN PRN
Status: DISCONTINUED | OUTPATIENT
Start: 2017-03-01 | End: 2017-03-01 | Stop reason: HOSPADM

## 2017-03-01 RX ORDER — ACETAMINOPHEN 325 MG/1
650 TABLET ORAL EVERY 6 HOURS
Status: DISCONTINUED | OUTPATIENT
Start: 2017-03-01 | End: 2017-03-03

## 2017-03-01 RX ORDER — OXYCODONE HYDROCHLORIDE 5 MG/1
5-10 TABLET ORAL
Status: DISCONTINUED | OUTPATIENT
Start: 2017-03-01 | End: 2017-03-01

## 2017-03-01 RX ORDER — PHENAZOPYRIDINE HYDROCHLORIDE 200 MG/1
200 TABLET, FILM COATED ORAL ONCE
Status: COMPLETED | OUTPATIENT
Start: 2017-03-01 | End: 2017-03-01

## 2017-03-01 RX ORDER — ALBUMIN, HUMAN INJ 5% 5 %
SOLUTION INTRAVENOUS CONTINUOUS PRN
Status: DISCONTINUED | OUTPATIENT
Start: 2017-03-01 | End: 2017-03-01

## 2017-03-01 RX ORDER — SODIUM CHLORIDE, SODIUM LACTATE, POTASSIUM CHLORIDE, CALCIUM CHLORIDE 600; 310; 30; 20 MG/100ML; MG/100ML; MG/100ML; MG/100ML
INJECTION, SOLUTION INTRAVENOUS CONTINUOUS
Status: DISCONTINUED | OUTPATIENT
Start: 2017-03-01 | End: 2017-03-01 | Stop reason: HOSPADM

## 2017-03-01 RX ORDER — IBUPROFEN 600 MG/1
600 TABLET, FILM COATED ORAL EVERY 6 HOURS
Status: DISCONTINUED | OUTPATIENT
Start: 2017-03-01 | End: 2017-03-03 | Stop reason: HOSPADM

## 2017-03-01 RX ORDER — SODIUM CHLORIDE, SODIUM LACTATE, POTASSIUM CHLORIDE, CALCIUM CHLORIDE 600; 310; 30; 20 MG/100ML; MG/100ML; MG/100ML; MG/100ML
INJECTION, SOLUTION INTRAVENOUS CONTINUOUS
Status: DISCONTINUED | OUTPATIENT
Start: 2017-03-01 | End: 2017-03-02

## 2017-03-01 RX ORDER — PROPOFOL 10 MG/ML
INJECTION, EMULSION INTRAVENOUS PRN
Status: DISCONTINUED | OUTPATIENT
Start: 2017-03-01 | End: 2017-03-01

## 2017-03-01 RX ORDER — ACETAMINOPHEN 325 MG/1
975 TABLET ORAL ONCE
Status: COMPLETED | OUTPATIENT
Start: 2017-03-01 | End: 2017-03-01

## 2017-03-01 RX ORDER — SCOLOPAMINE TRANSDERMAL SYSTEM 1 MG/1
1 PATCH, EXTENDED RELEASE TRANSDERMAL ONCE
Status: COMPLETED | OUTPATIENT
Start: 2017-03-01 | End: 2017-03-01

## 2017-03-01 RX ORDER — ONDANSETRON 4 MG/1
4 TABLET, ORALLY DISINTEGRATING ORAL EVERY 8 HOURS PRN
Status: DISCONTINUED | OUTPATIENT
Start: 2017-03-02 | End: 2017-03-02

## 2017-03-01 RX ORDER — NALOXONE HYDROCHLORIDE 0.4 MG/ML
.1-.4 INJECTION, SOLUTION INTRAMUSCULAR; INTRAVENOUS; SUBCUTANEOUS
Status: DISCONTINUED | OUTPATIENT
Start: 2017-03-01 | End: 2017-03-01 | Stop reason: HOSPADM

## 2017-03-01 RX ORDER — APREPITANT 40 MG/1
40 CAPSULE ORAL DAILY
Status: DISCONTINUED | OUTPATIENT
Start: 2017-03-01 | End: 2017-03-01

## 2017-03-01 RX ORDER — NALOXONE HYDROCHLORIDE 0.4 MG/ML
.1-.4 INJECTION, SOLUTION INTRAMUSCULAR; INTRAVENOUS; SUBCUTANEOUS
Status: DISCONTINUED | OUTPATIENT
Start: 2017-03-01 | End: 2017-03-03 | Stop reason: HOSPADM

## 2017-03-01 RX ORDER — LIDOCAINE 40 MG/G
CREAM TOPICAL
Status: DISCONTINUED | OUTPATIENT
Start: 2017-03-01 | End: 2017-03-03 | Stop reason: HOSPADM

## 2017-03-01 RX ORDER — MEPERIDINE HYDROCHLORIDE 25 MG/ML
12.5 INJECTION INTRAMUSCULAR; INTRAVENOUS; SUBCUTANEOUS
Status: DISCONTINUED | OUTPATIENT
Start: 2017-03-01 | End: 2017-03-01 | Stop reason: HOSPADM

## 2017-03-01 RX ORDER — AMOXICILLIN 250 MG
1-2 CAPSULE ORAL 2 TIMES DAILY
Status: DISCONTINUED | OUTPATIENT
Start: 2017-03-01 | End: 2017-03-03 | Stop reason: HOSPADM

## 2017-03-01 RX ORDER — APREPITANT 40 MG/1
40 CAPSULE ORAL
Status: COMPLETED | OUTPATIENT
Start: 2017-03-01 | End: 2017-03-01

## 2017-03-01 RX ORDER — ONDANSETRON 2 MG/ML
INJECTION INTRAMUSCULAR; INTRAVENOUS PRN
Status: DISCONTINUED | OUTPATIENT
Start: 2017-03-01 | End: 2017-03-01

## 2017-03-01 RX ORDER — SIMETHICONE 80 MG
80 TABLET,CHEWABLE ORAL 4 TIMES DAILY PRN
Status: DISCONTINUED | OUTPATIENT
Start: 2017-03-01 | End: 2017-03-03 | Stop reason: HOSPADM

## 2017-03-01 RX ORDER — ONDANSETRON 4 MG/1
4 TABLET, ORALLY DISINTEGRATING ORAL EVERY 8 HOURS
Status: DISCONTINUED | OUTPATIENT
Start: 2017-03-01 | End: 2017-03-02

## 2017-03-01 RX ORDER — NALBUPHINE HYDROCHLORIDE 10 MG/ML
2.5-5 INJECTION, SOLUTION INTRAMUSCULAR; INTRAVENOUS; SUBCUTANEOUS EVERY 6 HOURS PRN
Status: DISCONTINUED | OUTPATIENT
Start: 2017-03-01 | End: 2017-03-01

## 2017-03-01 RX ORDER — DEXAMETHASONE SODIUM PHOSPHATE 4 MG/ML
INJECTION, SOLUTION INTRA-ARTICULAR; INTRALESIONAL; INTRAMUSCULAR; INTRAVENOUS; SOFT TISSUE PRN
Status: DISCONTINUED | OUTPATIENT
Start: 2017-03-01 | End: 2017-03-01

## 2017-03-01 RX ORDER — CEFAZOLIN SODIUM 2 G/100ML
2 INJECTION, SOLUTION INTRAVENOUS
Status: COMPLETED | OUTPATIENT
Start: 2017-03-01 | End: 2017-03-01

## 2017-03-01 RX ORDER — OXYCODONE HYDROCHLORIDE 5 MG/1
5-10 TABLET ORAL
Status: DISCONTINUED | OUTPATIENT
Start: 2017-03-01 | End: 2017-03-03

## 2017-03-01 RX ADMIN — CEFAZOLIN SODIUM 1 G: 2 INJECTION, SOLUTION INTRAVENOUS at 18:30

## 2017-03-01 RX ADMIN — IBUPROFEN 600 MG: 600 TABLET ORAL at 23:55

## 2017-03-01 RX ADMIN — MIDAZOLAM 1 MG: 1 INJECTION INTRAMUSCULAR; INTRAVENOUS at 11:19

## 2017-03-01 RX ADMIN — PHENAZOPYRIDINE HYDROCHLORIDE 200 MG: 200 TABLET ORAL at 10:51

## 2017-03-01 RX ADMIN — ROCURONIUM BROMIDE 20 MG: 10 INJECTION INTRAVENOUS at 17:00

## 2017-03-01 RX ADMIN — ROCURONIUM BROMIDE 20 MG: 10 INJECTION INTRAVENOUS at 17:50

## 2017-03-01 RX ADMIN — FENTANYL CITRATE 100 MCG: 50 INJECTION, SOLUTION INTRAMUSCULAR; INTRAVENOUS at 17:50

## 2017-03-01 RX ADMIN — MIDAZOLAM HYDROCHLORIDE 2 MG: 1 INJECTION, SOLUTION INTRAMUSCULAR; INTRAVENOUS at 15:48

## 2017-03-01 RX ADMIN — MAGNESIUM HYDROXIDE 15 ML: 400 SUSPENSION ORAL at 22:37

## 2017-03-01 RX ADMIN — ALBUMIN (HUMAN): 12.5 SOLUTION INTRAVENOUS at 17:37

## 2017-03-01 RX ADMIN — SODIUM CHLORIDE, POTASSIUM CHLORIDE, SODIUM LACTATE AND CALCIUM CHLORIDE: 600; 310; 30; 20 INJECTION, SOLUTION INTRAVENOUS at 15:48

## 2017-03-01 RX ADMIN — PROPOFOL 100 MG: 10 INJECTION, EMULSION INTRAVENOUS at 15:54

## 2017-03-01 RX ADMIN — LIDOCAINE HYDROCHLORIDE 60 MG: 20 INJECTION, SOLUTION INFILTRATION; PERINEURAL at 15:54

## 2017-03-01 RX ADMIN — SCOPALAMINE 1 PATCH: 1 PATCH, EXTENDED RELEASE TRANSDERMAL at 10:51

## 2017-03-01 RX ADMIN — FENTANYL CITRATE 50 MCG: 50 INJECTION, SOLUTION INTRAMUSCULAR; INTRAVENOUS at 11:19

## 2017-03-01 RX ADMIN — ALBUMIN (HUMAN): 12.5 SOLUTION INTRAVENOUS at 17:43

## 2017-03-01 RX ADMIN — CEFAZOLIN SODIUM 2 G: 2 INJECTION, SOLUTION INTRAVENOUS at 16:30

## 2017-03-01 RX ADMIN — ACETAMINOPHEN 975 MG: 325 TABLET, FILM COATED ORAL at 10:51

## 2017-03-01 RX ADMIN — SUGAMMADEX 150 MG: 100 INJECTION, SOLUTION INTRAVENOUS at 19:06

## 2017-03-01 RX ADMIN — FENTANYL CITRATE 50 MCG: 50 INJECTION, SOLUTION INTRAMUSCULAR; INTRAVENOUS at 18:36

## 2017-03-01 RX ADMIN — SODIUM CHLORIDE, POTASSIUM CHLORIDE, SODIUM LACTATE AND CALCIUM CHLORIDE: 600; 310; 30; 20 INJECTION, SOLUTION INTRAVENOUS at 17:50

## 2017-03-01 RX ADMIN — ONDANSETRON 4 MG: 4 TABLET, ORALLY DISINTEGRATING ORAL at 22:36

## 2017-03-01 RX ADMIN — SODIUM CHLORIDE, POTASSIUM CHLORIDE, SODIUM LACTATE AND CALCIUM CHLORIDE: 600; 310; 30; 20 INJECTION, SOLUTION INTRAVENOUS at 22:37

## 2017-03-01 RX ADMIN — OXYCODONE HYDROCHLORIDE 5 MG: 5 TABLET ORAL at 23:55

## 2017-03-01 RX ADMIN — FENTANYL CITRATE 50 MCG: 50 INJECTION, SOLUTION INTRAMUSCULAR; INTRAVENOUS at 15:54

## 2017-03-01 RX ADMIN — ALBUMIN (HUMAN): 12.5 SOLUTION INTRAVENOUS at 17:16

## 2017-03-01 RX ADMIN — ACETAMINOPHEN 650 MG: 325 TABLET, FILM COATED ORAL at 22:36

## 2017-03-01 RX ADMIN — FENTANYL CITRATE 50 MCG: 50 INJECTION, SOLUTION INTRAMUSCULAR; INTRAVENOUS at 20:03

## 2017-03-01 RX ADMIN — SENNOSIDES AND DOCUSATE SODIUM 1 TABLET: 8.6; 5 TABLET ORAL at 22:37

## 2017-03-01 RX ADMIN — BUPIVACAINE HYDROCHLORIDE: 7.5 INJECTION, SOLUTION EPIDURAL; RETROBULBAR at 20:10

## 2017-03-01 RX ADMIN — DEXAMETHASONE SODIUM PHOSPHATE 6 MG: 4 INJECTION, SOLUTION INTRAMUSCULAR; INTRAVENOUS at 16:05

## 2017-03-01 RX ADMIN — ONDANSETRON 4 MG: 2 INJECTION INTRAMUSCULAR; INTRAVENOUS at 16:05

## 2017-03-01 RX ADMIN — SODIUM CHLORIDE, POTASSIUM CHLORIDE, SODIUM LACTATE AND CALCIUM CHLORIDE: 600; 310; 30; 20 INJECTION, SOLUTION INTRAVENOUS at 19:03

## 2017-03-01 RX ADMIN — HEPARIN SODIUM 5000 UNITS: 10000 INJECTION, SOLUTION INTRAVENOUS; SUBCUTANEOUS at 11:31

## 2017-03-01 RX ADMIN — FENTANYL CITRATE 50 MCG: 50 INJECTION, SOLUTION INTRAMUSCULAR; INTRAVENOUS at 17:00

## 2017-03-01 RX ADMIN — FENTANYL CITRATE 50 MCG: 50 INJECTION, SOLUTION INTRAMUSCULAR; INTRAVENOUS at 19:40

## 2017-03-01 RX ADMIN — ROCURONIUM BROMIDE 50 MG: 10 INJECTION INTRAVENOUS at 15:55

## 2017-03-01 RX ADMIN — APREPITANT 40 MG: 40 CAPSULE ORAL at 10:52

## 2017-03-01 NOTE — ANESTHESIA PROCEDURE NOTES
Epidural Procedure Note    Staff:     Anesthesiologist:  NIYAH YORK    Resident/CRNA:  HORTENCIA TEJEDA    Referred By:  leonardo Fernandez    Procedure performed by resident/CRNA in the presence of a teaching physician    Location: Pre-op     Procedure start time:  3/1/2017 10:55 AM     Procedure end time:  3/1/2017 11:10 AM   Pre-procedure checklist:   patient identified, IV checked, site marked, risks and benefits discussed, informed consent, monitors and equipment checked, pre-op evaluation, at physician/surgeon's request and post-op pain management      Correct Patient: Yes      Correct Position: Yes      Correct Site: Yes      Correct Procedure: Yes      Correct Laterality:  Yes    Site Marked:  Yes  Procedure:     Procedure:  Epidural catheter    Position:  Sitting    Sterile Prep: chloraprep, mask, sterile gloves and patient draped      Insertion site:  T10-11    Local skin infiltration:  1% lidocaine    amount (mL):  2    Approach:  Midline    Needle gauge (G):  17    Needle Length (in):  3.5    Block Needle Type:  Touhy    Injection Technique:  LORT saline    PIERRE at (cm):  4    Attempts:  1    Redirects:  0    Catheter gauge (G):  19    Catheter threaded easily: Yes      Threaded to cm at skin:  8    Paresthesias:  No    Aspiration negative for Heme or CSF: Yes      Test dose (mL):  3     Local anesthetic:  Lidocaine 1.5% w/ 1:200,000 epinephrine    Test dose time:  11:10    Test dose negative for signs of intravascular, subdural or intrathecal injection: Yes

## 2017-03-01 NOTE — ANESTHESIA PROCEDURE NOTES
Arterial Line Procedure Note  Staff:     Anesthesiologist:  ANDREEA RUSSO  Location: In OR After Induction  Procedure Start/Stop Times:     patient identified, IV checked, site marked, risks and benefits discussed, informed consent, monitors and equipment checked, pre-op evaluation and at physician/surgeon's request      Correct Patient: Yes      Correct Position: Yes      Correct Site: Yes      Correct Procedure: Yes      Correct Laterality:  Yes    Site Marked:  Yes  Line Placement:     Procedure:  Arterial Line    Insertion Site:  Radial    Insertion laterality:  Right    Skin Prep: Chloraprep      Patient Prep: patient draped, mask, sterile gloves, hat and hand hygiene      Local skin infiltration:  None    Ultrasound Guided?: No      Catheter size:  20 gauge, 12 cm    Cath secured with: other (comment)      Dressing:  Tegaderm    Complications:  None obvious    Arterial waveform: Yes      IBP within 10% of NIBP: Yes

## 2017-03-01 NOTE — IP AVS SNAPSHOT
[address] [address]    Phone:  [phone]                                       After Visit Summary   3/1/2017    Paula Esparza    MRN: [id number]           After Visit Summary Signature Page     I have received my discharge instructions, and my questions have been answered. I have discussed any challenges I see with this plan with the nurse or doctor.    ..........................................................................................................................................  Patient/Patient Representative Signature      ..........................................................................................................................................  Patient Representative Print Name and Relationship to Patient    ..................................................               ................................................  Date                                            Time    ..........................................................................................................................................  Reviewed by Signature/Title    ...................................................              ..............................................  Date                                                            Time

## 2017-03-01 NOTE — ANESTHESIA PREPROCEDURE EVALUATION
Physical Exam  Normal systems: cardiovascular, pulmonary and dental    Airway   Mallampati: I  TM distance: >3 FB  Neck ROM: full    Dental     Cardiovascular       Pulmonary                     Anesthesia Plan      History & Physical Review  History and physical reviewed and following examination; no interval change.    ASA Status:  2 .        Plan for General and ETT with Intravenous induction. Maintenance will be Balanced.    PONV prophylaxis:  Ondansetron (or other 5HT-3), Aprepitant and Dexamethasone or Solumedrol   Anesthesia type: Combined General with Block    Pre-op diagnosis: Pelvic Mass     Location: [facility] / [facility]    Surgeon: Haley Fernandez MD       Patient part of research study - epidural vs TAP. No long acting narcotics    Plan: GA with T11 epidural. Does with Fentanyl 100 mcq and Bupivacaine 0.25%, 5cc as fascia being closed. No need to start epid infusion in OR. Confirm T&S.      Postoperative Care  Postoperative pain management:  IV analgesics and Peripheral nerve block (Single Shot).      Consents  Anesthetic plan, risks, benefits and alternatives discussed with:  Patient.  Use of blood products discussed: Yes.   Consented to blood products.  .        Paula Esparza [[id number]]  Female - 31 year old - [birth year]  SALPINGO-OOPHORECTOMY, DISSECT LYMPH NODE (N/A Abdomen)       Preprocedure Vitals      No BP, pulse, respiration, SpO2, or temperature recorded.   Height:      Weight:      BMI:      IBW:             Allergies      No Known Allergies       Prescription Medications         Last Taken Last Updated     imiquimod (ALDARA) 5 % cream    Taking 02/23/17 0956     norethindrone-ethinyl estradiol (JUNEL FE 1/20) 1-20 MG-MCG per tablet    Taking 02/23/17 0956       Hematology Labs        WBC        5.5 02/23/17 1245       Electrolyte Labs        K+    Na+        4.0 02/23/17 1245 141 02/23/17 1245       Other Labs        QUITA    ALT    AST        0.2 02/23/17 1245 47 02/23/17 1245 49   17 1245       Last Menstrual Period      LMP        2017         Substance History      Smoking Status: Current Every Day Smoker     Smokeless Tobacco Status: Never Used     Alcohol use: Yes, unspecified volume     Drug use: No       Facility Administered Medications      No medications found       Anesthesia Family History      Breast Cancer Maternal Grandmother, Paternal Grandmother     CANCER Mother     Coronary Artery Disease Maternal Grandmother     Coronary Artery Disease Early Onset Father     DIABETES Father       Problem List      Pelvic mass Pre-operative examination       Medical History        Depression        Surgical History      No surgical history recorded       Obstetric History       Para Term Premature Abortions SAB TAB Ectopic Mult Births Living     0 0 0 0 0 0 0 0 0 0                        Last Written Preprocedure Note      No anesthesia note exists             Paula Esparza [[id number]]  Female - 31 year old - [birth year]  SALPINGO-OOPHORECTOMY, DISSECT LYMPH NODE (N/A Abdomen)       Hematology Labs        WBC    RBC    Hematocrit    Hemoglobin    Plt    MCV        5.5 17 1245 4.50 17 1245 36.8 17 1245 12.3 17 1245 279 17 1245 82 17 1245       Urine Labs        No relevant labs found       Pregnancy Labs        No relevant labs found       Chemistry Labs        Na+    K+    Ca2+    Cl    BUN    CRT        141 17 1245 4.0 17 1245 9.6 17 1245 107 17 1245 9 17 1245 0.74 17 1245       Blood Gases        No relevant labs found       Coagulation Labs        No relevant labs found       Electrolyte Labs        Na    Cl        141 17 1245 107 17 1245       Other Labs        ALT    AST        47 17 1245 49  17 1245              BLOOD BANK       Name Ordering Date/Time Resulting Date/Time Status Priority Auth Provider      ABO/Rh type and screen 2017 11:23 PM  Signed/Held Routine Zhanna  BRAYAN Kamara MD                       .

## 2017-03-01 NOTE — IP AVS SNAPSHOT
MRN:[id number]                      After Visit Summary   3/1/2017    Paula Esparza    MRN: [id number]           Thank you!     Thank you for choosing [facility] for your care. Our goal is always to provide you with excellent care. Hearing back from our patients is one way we can continue to improve our services. Please take a few minutes to complete the written survey that you may receive in the mail after you visit with us. Thank you!        Patient Information     Date Of Birth          1985        About your hospital stay     You were admitted on:  March 1, 2017 You last received care in the:  [address] [facility]    You were discharged on:  March 3, 2017        Reason for your hospital stay       Surgery            Reason for your hospital stay       You were admitted for surgery for the removal of an abdominal mass, which appears to be a dermoid on frozen pathology. The final results of the pathology are still pending.                  Who to Call     For medical emergencies, please call 911.  For non-urgent questions about your medical care, please call your primary care provider or clinic, None  For questions related to your surgery, please call your surgery clinic        Attending Provider     Provider Specialty    Haley Fernandez MD Oncology       Primary Care Provider    Physician No Ref-Primary       No address on file         When to contact your care team       GENERAL POST-OPERATIVE  PATIENT INSTRUCTIONS      FOLLOW-UP:    Call Surgeon if you have:  Temperature greater than 100.4  Persistent nausea and vomiting  Severe uncontrolled pain  Redness, tenderness, or signs of infection (pain, swelling, redness, odor or green/yellow discharge around the site)  Difficulty breathing, headache or visual disturbances  Hives  Persistent dizziness or light-headedness  Extreme fatigue  Any other questions or concerns you may have after discharge    In an emergency, call 911 or go to an  Emergency Department at a nearby hospital       WOUND CARE INSTRUCTIONS:  Keep a dry clean dressing on the wound if there is drainage. If you had a bandage initially, it may be removed after 24 hours.  Once the wound has quit draining you may leave it open to air.  If clothing rubs against the wound or causes irritation and the wound is not draining you may cover it with a dry dressing during the daytime.  Try to keep the wound dry and avoid ointments on the wound unless directed to do so.  If the wound becomes bright red and painful or starts to drain infected material that is not clear, please contact your physician immediately.    1.  You may shower 24 hrs after surgery   2.  No soaking in the tub for 4 weeks       DIET:  There are no dietary restrictions.  You may eat any foods that you can tolerate unless instructed otherwise.  It is a good idea to eat a high fiber diet and take in plenty of fluids to prevent constipation.  If you become constipated, please follow the instructions below.    ACTIVITY:  You are encouraged to cough, deep breath and use your incentive spirometer if you were given one, every 15-30 minutes when awake.  This will help prevent respiratory complications and low grade fevers post-operatively.  You may want to hug a pillow when coughing and sneezing to add additional support to the surgical area, if you had abdominal surgery, which will decrease pain during these times.      1.  No heavy lifting >20lbs or strenuous exercise for six-eight weeks.  No exercise in which you are using core muscles (yoga, pilates, swimming, weight lifting)  2.  You may walk as much as you wish.  You are encouraged to increase your activity each day after surgery.  Stairs are okay.   3.  Nothing per vagina for eight weeks.  No tampons, no intercourse, no douching.  You can expect some light vaginal spotting and discharge for up to six weeks.  If bleeding becomes heavy, please contact the office.     MEDICATIONS:   Try to take narcotic medications and anti-inflammatory medications, such as tylenol, ibuprofen, naprosyn, etc., with food.  This will minimize stomach upset from the medication.  Should you develop nausea and vomiting from the pain medication, or develop a rash, please discontinue the medication and contact your physician.  You should not drive, make important decisions, or operate machinery when taking narcotic pain medication.    OTHER:  Patients are often constipated after general anesthesia and surgery.  The patient should continue to take stool softeners (for example, Senokot-S) for the next six weeks (unless diarrhea develops) and consume adequate amounts of water.  If the patient remains constipated or unable to pass stool, please try one or all of the following measures:  1.  Milk of Magnesia 30cc twice a day as needed by mouth  2.  Metamucil 2 tablespoons in 12 ounces of fluid  3.  Dulcolax oral or suppositories  4.  Prunes or prune juice  5.  Miralax daily      QUESTIONS:  Please feel free to call your physician or the hospital  if you have any questions, and they will be glad to assist you.                  After Care Instructions     Activity       Your activity upon discharge: activity as tolerated            Activity       Your activity upon discharge: No strenuous activity or lifting more than 20 pounds for 6 weeks. No driving for at least 2 weeks (or until you are able to slam on the brakes without abdominal pain). No driving while taking narcotic pain medications.            Diet       Follow this diet upon discharge: Orders Placed This Encounter      Regular Diet Adult            Diet       Follow this diet upon discharge: Regular                  Follow-up Appointments     Adult [facility] Follow-up and recommended labs and tests       3/30/17 2 pm Dr Fernandez Post op visit. Call prior to then for questions or concerns [phone]    Appointments on [place] and/or [facility] (with  "[facility] or [facility] provider or service). Call [phone] if you haven't heard regarding these appointments within 7 days of discharge.            Adult [facility] Follow-up and recommended labs and tests       You have a follow-up visit scheduled with Dr. Fernandez on 3/30/17.    Appointments on [place] and/or [facility] (with [facility] or [facility] provider or service). Call [phone] if you haven't heard regarding these appointments within 7 days of discharge.                  Your next 10 appointments already scheduled     Mar 30, 2017  2:00 PM CDT   (Arrive by 1:45 PM)   Post-Op with Haley Fernandez MD   [facility])    [address] [place] [address] [phone]              Additional Information     If you use hormonal birth control (such as the pill, patch, ring or implants): You'll need a second form of birth control for 7 days (condoms, a diaphragm or contraceptive foam). While in the hospital, you received a medicine called Bridion. Your normal birth control will not work as well for a week after taking this medicine.          Pending Results     Date and Time Order Name Status Description    3/1/2017 1649 Surgical pathology exam Preliminary             Statement of Approval     Ordered          03/03/17 5100  I have reviewed and agree with all the recommendations and orders detailed in this document.  EFFECTIVE NOW     Approved and electronically signed by:  Sylvia Tovar MD             Admission Information     Date & Time Department Dept. Phone    3/1/2017 [address] [facility] [phone]      Your Vitals Were     Blood Pressure Pulse Temperature Respirations Height Weight    105/64 (BP Location: Right arm) 59 97.8  F (36.6  C) (Oral) 14 1.638 m (5' 4.49\") 62.9 kg (138 lb 9.6 oz)    Last Period Pulse Oximetry BMI (Body Mass Index)             02/21/2017 100% 23.43 kg/m2         MyChart Information     MyChart lets " "you send messages to your doctor, view your test results, renew your prescriptions, schedule appointments and more. To sign up, go to [email or web address] . Click on \"Log in\" on the left side of the screen, which will take you to the Welcome page. Then click on \"Sign up Now\" on the right side of the page.     You will be asked to enter the access code listed below, as well as some personal information. Please follow the directions to create your username and password.     Your access code is: [email or web address]  Expires: 2017  1:49 PM     Your access code will  in 90 days. If you need help or a new code, please call your [facility] clinic or [phone].        Care EveryWhere ID     This is your Care EveryWhere ID. This could be used by other organizations to access your [facility] medical records  [id number]           Review of your medicines      START taking        Dose / Directions    HYDROcodone-acetaminophen 5-325 MG per tablet   Commonly known as:  NORCO   Used for:  Acute post-operative pain        Dose:  1-2 tablet   Take 1-2 tablets by mouth every 4 hours as needed for moderate to severe pain   Quantity:  25 tablet   Refills:  0       * ibuprofen 600 MG tablet   Commonly known as:  ADVIL/MOTRIN   Used for:  Dermoid cyst        Dose:  600 mg   Take 1 tablet (600 mg) by mouth every 6 hours   Quantity:  60 tablet   Refills:  0       * ibuprofen 600 MG tablet   Commonly known as:  ADVIL/MOTRIN   Used for:  Acute post-operative pain        Dose:  600 mg   Take 1 tablet (600 mg) by mouth every 6 hours as needed for moderate pain   Quantity:  40 tablet   Refills:  1       * senna-docusate 8.6-50 MG per tablet   Commonly known as:  SENOKOT-S;PERICOLACE   Used for:  Dermoid cyst        Dose:  1-2 tablet   Take 1-2 tablets by mouth 2 times daily Start with 1 tablet PO BID, If no bowel movement in 24 hours, increase to 2 tablets PO BID.  Hold for loose stools. Preferred agent for constipation related to " opioids.   Quantity:  100 tablet   Refills:  0       * senna-docusate 8.6-50 MG per tablet   Commonly known as:  SB DOCUSATE SODIUM/SENNA   Used for:  Acute post-operative pain        Dose:  1 tablet   Take 1 tablet by mouth 2 times daily as needed for constipation   Quantity:  60 tablet   Refills:  1       * Notice:  This list has 4 medication(s) that are the same as other medications prescribed for you. Read the directions carefully, and ask your doctor or other care provider to review them with you.      CONTINUE these medicines which have NOT CHANGED        Dose / Directions    imiquimod 5 % cream   Commonly known as:  ALDARA        Apply to affected areas qOD x 6 weeks.   Refills:  0       norethindrone-ethinyl estradiol 1-20 MG-MCG per tablet   Commonly known as:  JUNEL FE 1/20        Refills:  0            Where to get your medicines      These medications were sent to [facility] [place], [place] - [address] [address]     Phone:  [phone]     ibuprofen 600 MG tablet    ibuprofen 600 MG tablet    senna-docusate 8.6-50 MG per tablet    senna-docusate 8.6-50 MG per tablet         Some of these will need a paper prescription and others can be bought over the counter. Ask your nurse if you have questions.     Bring a paper prescription for each of these medications     HYDROcodone-acetaminophen 5-325 MG per tablet                Protect others around you: Learn how to safely use, store and throw away your medicines at www.disposemymeds.org.             Medication List: This is a list of all your medications and when to take them. Check marks below indicate your daily home schedule. Keep this list as a reference.      Medications           Morning Afternoon Evening Bedtime As Needed    HYDROcodone-acetaminophen 5-325 MG per tablet   Commonly known as:  NORCO   Take 1-2 tablets by mouth every 4 hours as needed for moderate to severe pain   Last time  this was given:  1 tablet on 3/3/2017  5:59 PM                                * ibuprofen 600 MG tablet   Commonly known as:  ADVIL/MOTRIN   Take 1 tablet (600 mg) by mouth every 6 hours   Last time this was given:  600 mg on 3/3/2017  3:11 PM                                * ibuprofen 600 MG tablet   Commonly known as:  ADVIL/MOTRIN   Take 1 tablet (600 mg) by mouth every 6 hours as needed for moderate pain   Last time this was given:  600 mg on 3/3/2017  3:11 PM                                imiquimod 5 % cream   Commonly known as:  ALDARA   Apply to affected areas qOD x 6 weeks.                                norethindrone-ethinyl estradiol 1-20 MG-MCG per tablet   Commonly known as:  JUNEL FE 1/20                                * senna-docusate 8.6-50 MG per tablet   Commonly known as:  SENOKOT-S;PERICOLACE   Take 1-2 tablets by mouth 2 times daily Start with 1 tablet PO BID, If no bowel movement in 24 hours, increase to 2 tablets PO BID.  Hold for loose stools. Preferred agent for constipation related to opioids.   Last time this was given:  1 tablet on 3/3/2017 11:47 AM                                * senna-docusate 8.6-50 MG per tablet   Commonly known as:  SB DOCUSATE SODIUM/SENNA   Take 1 tablet by mouth 2 times daily as needed for constipation   Last time this was given:  1 tablet on 3/3/2017 11:47 AM                                * Notice:  This list has 4 medication(s) that are the same as other medications prescribed for you. Read the directions carefully, and ask your doctor or other care provider to review them with you.

## 2017-03-01 NOTE — OR NURSING
Text page sent to Dr. Fernandez informing that order for consent in epic does not match signed consent in chart.

## 2017-03-02 LAB
ANION GAP SERPL CALCULATED.3IONS-SCNC: 10 MMOL/L (ref 3–14)
BASOPHILS # BLD AUTO: 0 10E9/L (ref 0–0.2)
BASOPHILS NFR BLD AUTO: 0 %
BUN SERPL-MCNC: 10 MG/DL (ref 7–30)
CALCIUM SERPL-MCNC: 8.7 MG/DL (ref 8.5–10.1)
CHLORIDE SERPL-SCNC: 104 MMOL/L (ref 94–109)
CO2 SERPL-SCNC: 22 MMOL/L (ref 20–32)
CREAT SERPL-MCNC: 0.75 MG/DL (ref 0.52–1.04)
DIFFERENTIAL METHOD BLD: ABNORMAL
EOSINOPHIL # BLD AUTO: 0 10E9/L (ref 0–0.7)
EOSINOPHIL NFR BLD AUTO: 0 %
ERYTHROCYTE [DISTWIDTH] IN BLOOD BY AUTOMATED COUNT: 16.1 % (ref 10–15)
GFR SERPL CREATININE-BSD FRML MDRD: NORMAL ML/MIN/1.7M2
GLUCOSE SERPL-MCNC: 96 MG/DL (ref 70–99)
HCT VFR BLD AUTO: 32.7 % (ref 35–47)
HGB BLD-MCNC: 10.7 G/DL (ref 11.7–15.7)
IMM GRANULOCYTES # BLD: 0 10E9/L (ref 0–0.4)
IMM GRANULOCYTES NFR BLD: 0.2 %
LYMPHOCYTES # BLD AUTO: 0.9 10E9/L (ref 0.8–5.3)
LYMPHOCYTES NFR BLD AUTO: 8.9 %
MCH RBC QN AUTO: 26.8 PG (ref 26.5–33)
MCHC RBC AUTO-ENTMCNC: 32.7 G/DL (ref 31.5–36.5)
MCV RBC AUTO: 82 FL (ref 78–100)
MONOCYTES # BLD AUTO: 0.4 10E9/L (ref 0–1.3)
MONOCYTES NFR BLD AUTO: 4.1 %
NEUTROPHILS # BLD AUTO: 8.6 10E9/L (ref 1.6–8.3)
NEUTROPHILS NFR BLD AUTO: 86.8 %
NRBC # BLD AUTO: 0 10*3/UL
NRBC BLD AUTO-RTO: 0 /100
PLATELET # BLD AUTO: 205 10E9/L (ref 150–450)
POTASSIUM SERPL-SCNC: 4.2 MMOL/L (ref 3.4–5.3)
RBC # BLD AUTO: 3.99 10E12/L (ref 3.8–5.2)
SODIUM SERPL-SCNC: 137 MMOL/L (ref 133–144)
WBC # BLD AUTO: 9.9 10E9/L (ref 4–11)

## 2017-03-02 PROCEDURE — 99024 POSTOP FOLLOW-UP VISIT: CPT | Performed by: OBSTETRICS & GYNECOLOGY

## 2017-03-02 PROCEDURE — 25000128 H RX IP 250 OP 636: Performed by: OBSTETRICS & GYNECOLOGY

## 2017-03-02 PROCEDURE — 12000025 ZZH R&B TRANSPLANT INTERMEDIATE

## 2017-03-02 PROCEDURE — 25000132 ZZH RX MED GY IP 250 OP 250 PS 637: Performed by: OBSTETRICS & GYNECOLOGY

## 2017-03-02 PROCEDURE — 25000125 ZZHC RX 250: Performed by: OBSTETRICS & GYNECOLOGY

## 2017-03-02 PROCEDURE — 80048 BASIC METABOLIC PNL TOTAL CA: CPT | Performed by: OBSTETRICS & GYNECOLOGY

## 2017-03-02 PROCEDURE — 36415 COLL VENOUS BLD VENIPUNCTURE: CPT | Performed by: OBSTETRICS & GYNECOLOGY

## 2017-03-02 PROCEDURE — 85025 COMPLETE CBC W/AUTO DIFF WBC: CPT | Performed by: OBSTETRICS & GYNECOLOGY

## 2017-03-02 RX ORDER — BISACODYL 10 MG
10 SUPPOSITORY, RECTAL RECTAL DAILY
Status: DISCONTINUED | OUTPATIENT
Start: 2017-03-02 | End: 2017-03-03 | Stop reason: HOSPADM

## 2017-03-02 RX ORDER — ENOXAPARIN SODIUM 100 MG/ML
40 INJECTION SUBCUTANEOUS DAILY
Status: DISCONTINUED | OUTPATIENT
Start: 2017-03-02 | End: 2017-03-02

## 2017-03-02 RX ORDER — LORAZEPAM 1 MG/1
1-2 TABLET ORAL EVERY 4 HOURS PRN
Status: DISCONTINUED | OUTPATIENT
Start: 2017-03-02 | End: 2017-03-03 | Stop reason: HOSPADM

## 2017-03-02 RX ORDER — ONDANSETRON 4 MG/1
4 TABLET, ORALLY DISINTEGRATING ORAL EVERY 6 HOURS PRN
Status: DISCONTINUED | OUTPATIENT
Start: 2017-03-02 | End: 2017-03-03 | Stop reason: HOSPADM

## 2017-03-02 RX ADMIN — SENNOSIDES AND DOCUSATE SODIUM 1 TABLET: 8.6; 5 TABLET ORAL at 12:15

## 2017-03-02 RX ADMIN — SENNOSIDES AND DOCUSATE SODIUM 1 TABLET: 8.6; 5 TABLET ORAL at 08:34

## 2017-03-02 RX ADMIN — MAGNESIUM HYDROXIDE 15 ML: 400 SUSPENSION ORAL at 12:14

## 2017-03-02 RX ADMIN — ENOXAPARIN SODIUM 40 MG: 40 INJECTION SUBCUTANEOUS at 16:34

## 2017-03-02 RX ADMIN — OXYCODONE HYDROCHLORIDE 10 MG: 5 TABLET ORAL at 20:03

## 2017-03-02 RX ADMIN — IBUPROFEN 600 MG: 600 TABLET ORAL at 06:34

## 2017-03-02 RX ADMIN — ACETAMINOPHEN 650 MG: 325 TABLET, FILM COATED ORAL at 16:34

## 2017-03-02 RX ADMIN — OXYCODONE HYDROCHLORIDE 5 MG: 5 TABLET ORAL at 10:22

## 2017-03-02 RX ADMIN — OXYCODONE HYDROCHLORIDE 10 MG: 5 TABLET ORAL at 13:48

## 2017-03-02 RX ADMIN — ACETAMINOPHEN 650 MG: 325 TABLET, FILM COATED ORAL at 04:07

## 2017-03-02 RX ADMIN — ACETAMINOPHEN 650 MG: 325 TABLET, FILM COATED ORAL at 10:22

## 2017-03-02 RX ADMIN — IBUPROFEN 600 MG: 600 TABLET ORAL at 17:30

## 2017-03-02 RX ADMIN — OXYCODONE HYDROCHLORIDE 5 MG: 5 TABLET ORAL at 06:47

## 2017-03-02 RX ADMIN — SENNOSIDES AND DOCUSATE SODIUM 2 TABLET: 8.6; 5 TABLET ORAL at 20:03

## 2017-03-02 RX ADMIN — OXYCODONE HYDROCHLORIDE 10 MG: 5 TABLET ORAL at 16:34

## 2017-03-02 RX ADMIN — IBUPROFEN 600 MG: 600 TABLET ORAL at 12:14

## 2017-03-02 RX ADMIN — ONDANSETRON 4 MG: 4 TABLET, ORALLY DISINTEGRATING ORAL at 04:08

## 2017-03-02 ASSESSMENT — PAIN DESCRIPTION - DESCRIPTORS
DESCRIPTORS: ACHING;SHARP
DESCRIPTORS: ACHING

## 2017-03-02 NOTE — PROGRESS NOTES
"../63 (BP Location: Left arm)  Pulse 61  Temp 97.9  F (36.6  C) (Oral)  Resp 16  Ht 1.638 m (5' 4.49\")  Wt 62.6 kg (138 lb 0.1 oz)  LMP 02/21/2017  SpO2 100%  BMI 23.33 kg/m2  A&O. VSS. MSSA score 1. Lungs clear Adb soft, new incisional drsg chg done ABD binder provided. IV fluids stopped, peripheral IV patent. Voiding large amounts of clear yellow urine. Epidural removed this morning around 1030. Pt medicated with 5 mg Oxy and her scheduled tylenol and Motrin plus senna and MOM given. Pt has flatus and did not want the suppository until later if needed. Up walking in hallway with nursing staff. Uses Incentive spirometer. Feel nausea this morning and given 4 mg zofran ODT which helped. Pt eating small amount of food/snacks and drinking pitchers of water and cups of coffee. NOTE: this afternoon pt stated that she has had blurred vision today since this morning, paged sent to Anesthesia . Continue with plan of cares and medicate per orders.  "

## 2017-03-02 NOTE — PROGRESS NOTES
Gynecology Oncology Progress Note    Paula Esparza is a 31 year old  F, POD#1 s/p XL, RSO, TOPEL of abdominal mass, pelvic washings, colposcopy, cervical biopsies, endocervical curetting. Post op diagnosis of Dermoid cyst.     Dz: Large dermoid cyst, ASCUS +HPV    24-hour events:   - Procedure as above  - Beginning to appropriately meet postoperative goals    Subjective: Patient is feeling really well. Pain is very well controlled with her epidural, rarely requiring PO medication. Has only had clears so far but feels ready to try more. No flatus. Not nauseous. Castellanos in place. Does not feel anxious or uncomfortable.     Objective:   Patient Vitals for the past 3 hrs:   BP Temp Temp src Heart Rate Resp SpO2   17 107/67 97.8  F (36.6  C) Oral 56 16 99 %   17 106/73 98  F (36.7  C) Oral 56 16 99 %   17 107/77 - - 60 16 100 %   17 109/76 - - 60 16 99 %   17 107/67 - - 53 16 100 %   17 194 92/79 - - 50 16 100 %     General: Comfortable, NAD.   CV: RRR, no m/r/g  Resp: CTAB; RA  Abdomen: soft, appropriately tender, nondistended  Incision: c/d/i, intact bandage minimal drainage.   Extremities: nontender, no edema, SCDs in place    UOP: 131 cc/hr    I&Os  24 Hrs // Since MN   mL // 1200 mL  IVF 2000 mL // not recorded   mL // 1050 mL    New labs/imaging:   Results for orders placed or performed during the hospital encounter of 17 (from the past 24 hour(s))   ABO/Rh type and screen   Result Value Ref Range    ABO O     RH(D)  Pos     Antibody Screen Neg     Test Valid Only At       [facility]    Specimen Expires 2017    HCG qualitative urine   Result Value Ref Range    HCG Qual Urine Negative NEG   Basic metabolic panel   Result Value Ref Range    Sodium 137 133 - 144 mmol/L    Potassium 4.2 3.4 - 5.3 mmol/L    Chloride 104 94 - 109 mmol/L    Carbon Dioxide 22 20 - 32 mmol/L    Anion Gap  10 3 - 14 mmol/L    Glucose 96 70 - 99 mg/dL    Urea Nitrogen 10 7 - 30 mg/dL    Creatinine 0.75 0.52 - 1.04 mg/dL    GFR Estimate >90  Non  GFR Calc   >60 mL/min/1.7m2    GFR Estimate If Black >90   GFR Calc   >60 mL/min/1.7m2    Calcium 8.7 8.5 - 10.1 mg/dL   CBC with platelets differential   Result Value Ref Range    WBC 9.9 4.0 - 11.0 10e9/L    RBC Count 3.99 3.8 - 5.2 10e12/L    Hemoglobin 10.7 (L) 11.7 - 15.7 g/dL    Hematocrit 32.7 (L) 35.0 - 47.0 %    MCV 82 78 - 100 fl    MCH 26.8 26.5 - 33.0 pg    MCHC 32.7 31.5 - 36.5 g/dL    RDW 16.1 (H) 10.0 - 15.0 %    Platelet Count 205 150 - 450 10e9/L    Diff Method Automated Method     % Neutrophils 86.8 %    % Lymphocytes 8.9 %    % Monocytes 4.1 %    % Eosinophils 0.0 %    % Basophils 0.0 %    % Immature Granulocytes 0.2 %    Nucleated RBCs 0 0 /100    Absolute Neutrophil 8.6 (H) 1.6 - 8.3 10e9/L    Absolute Lymphocytes 0.9 0.8 - 5.3 10e9/L    Absolute Monocytes 0.4 0.0 - 1.3 10e9/L    Absolute Eosinophils 0.0 0.0 - 0.7 10e9/L    Absolute Basophils 0.0 0.0 - 0.2 10e9/L    Abs Immature Granulocytes 0.0 0 - 0.4 10e9/L    Absolute Nucleated RBC 0.0        Assessment: Paula Esparza is a 31 year old  F, POD#1 s/p XL, RSO, TOPEL of abdominal mass, pelvic washings, colposcopy, cervical biopsies, endocervical curetting. Post op diagnosis of Dermoid cyst.     Active Problem list:  - Postoperative state    31 year old  F, POD#1 s/p XL, RSO, TOPEL of abdominal mass, pelvic washings, colposcopy, cervical biopsies, endocervical curetting. Post op diagnosis of Dermoid cyst. Doing well postoperatively.   1. FEN: IVF 75cc/h, ADAT. DC IVF today   2. Pain: Epidural, scheduled ibuprofen, tylenol, PRN roxicodone. Plan for epidural removal today or tomorrow.   3. CV: No issues. Hypotension this am 84/50 likely secondary to epidural. Improved to 102/69. Currently stable.   4. Pulm: Current smoker. No issues.  5. Heme: Minimal  intraoperative blood loss and started at a normal Hgb.   6. GI: Clears, ADAT. Bowel regimen,PRN anti-emetics  7. : Castellanos in place, remove later this morning  8. ID: Currently afebrile.   9. Endocrine: No issues.  10. Psych/Neuro: No issues  11. Prophylaxis: SCDs, IS, Lovenox POD#1 if Hgb stable. Continue only while inpatient.   12. Dispo: routine postoperative cares; d/c home when meeting post operative milestones.  13. Other: Daily alcohol use 5-7 drinks per day. Monitor for signs of withdrawal ([facility] protocol ordered).     Joelle Larson MD  OBGYN, PGY1  Gyn Onc Pager [phone]    Provider Disclosure:   I agree with above History, Review of Systems, Physical exam and Plan. I have reviewed the content of the documentation and have edited it as needed. I have personally performed the services documented here and the documentation accurately represents those services and the decisions I have made.     Electronically signed by:   Gloria Caldwell MD   Gynecologic Oncology   [facility]

## 2017-03-02 NOTE — PROGRESS NOTES
REGIONAL ANESTHESIA PAIN SERVICE EPIDURAL NOTE  SUBJECTIVE:   Interval History: Pt reports adequate pain control via epidural.  Denies any weakness, paresthesias, circumoral numbness, metallic taste or tinnitus.  Pt is ambulating with assistance.  Patient is currently without nausea; without pruritis. Scopolomine patch in placel Currently tolerating a CL diet.       Clinically Aligned Pain Assessment (CAPA):  Comfort (How is your pain?): Tolerable with discomfort  Change in Pain (Since your last medication/intervention?): About the same  Pain Control (How are your pain treatments working?):  Partially effective pain control  Functioning (Are you able to do activities to get better?) : Can do most things, but pain gets in the way of some   Sleep (Does your pain management allow you to sleep or rest?): Awake with occasional pain      Anticoagulation:  none      OBJECTIVE:  Diagnostics:  Lab Results   Component Value Date    WBC 9.9 03/02/2017     Lab Results   Component Value Date    RBC 3.99 03/02/2017     Lab Results   Component Value Date    HGB 10.7 03/02/2017     Lab Results   Component Value Date    HCT 32.7 03/02/2017     Lab Results   Component Value Date     03/02/2017       No results found for: INR    Vitals:    Temp:  [97.8  F (36.6  C)-98.9  F (37.2  C)] 98.3  F (36.8  C)  Pulse:  [53-83] 53  Heart Rate:  [50-87] 61  Resp:  [11-22] 16  BP: ()/(50-97) 102/69  MAP:  [86 mmHg-131 mmHg] 131 mmHg  Arterial Line BP: (124-160)/() 160/108  SpO2:  [98 %-100 %] 100 %    Exam:    Strength 5/5 and symmetric grossly in bilateral LE      Epidural site with dressing c/d/i, no tenderness, erythema, heme, edema      ASSESSMENT/PLAN:    Paula Esparza is a 31 year old female POD #1 s/p SALPINGO-OOPHORECTOMY, DISSECT LYMPH NODE and placement of T10-11 epidural for analgesia.  Pt receiving adequate analgesia with epidural infusion Bupivacaine 0.125% at 10mL/hour.  Pt reports it felt like legs were going to  buckle when she stood.   Weakness in lower extremities likely from epidural spread. Stopped epidural infusion at 0920.  Discussed epidural management with Jocelyn KELLY, GYN/Onc and if pain well managed on oxycodone will DC epidural this afternoon.  Pt may discharge tomorrow. Discussed with patient she can take oxycodone 5-10mg po q 3 hrs. Pt acknowledged understanding.    - epidural infusion stopped at 0920 due to lower extremity weakness   - 1200 no significant change in pain - epidural catheter was DC'd dark tip intact without complication  - may give lovenox at 1400 or later today  - will sign off/call for questions or concerns  - discussed plan with attending anesthesiologist        LETICIA Dumont CNP  Regional Anesthesia Pain Service  3/2/2017 7:33 AM    24 hour Job Code Pager.  For in-house use only.     Dial * * [phone] and  [place]:  [phone]  [place]: [phone]  Peds:  [phone]  Then enter call-back number  May text page using [employer], but NOT [employer].

## 2017-03-02 NOTE — ANESTHESIA POSTPROCEDURE EVALUATION
Patient: Paula Esparza    Procedure(s):  Exploratory Laparotomy, Right Salpingo-Oophorectomy, Topel Of Abdominal Mass, Pelvic Washings Anesthesia general with Block  - Wound Class: II-Clean Contaminated    Diagnosis:Pelvic Mass   Diagnosis Additional Information: No value filed.    Anesthesia Type:  General, ETT    Note:  Anesthesia Post Evaluation    Patient location during evaluation: PACU  Patient participation: Able to fully participate in evaluation  Level of consciousness: awake and alert  Pain management: satisfactory to patient  Airway patency: patent  Cardiovascular status: blood pressure returned to baseline and hemodynamically stable  Respiratory status: room air  Hydration status: euvolemic  PONV: controlled     Anesthetic complications: None          Last vitals:  Vitals:    03/01/17 1330 03/01/17 1400 03/01/17 1430   BP: 110/69  (!) 107/94   Pulse:      Resp: 12 21 17   Temp:      SpO2: 99% 99% 98%         Electronically Signed By: Alexandru Sosa MD  March 1, 2017  8:15 PM

## 2017-03-02 NOTE — DISCHARGE SUMMARY
[facility] Discharge Summary    Paula Esparza MRN# [id number]   Age: 31 year old YOB: 1985     Date of Admission:  3/1/2017  Date of Discharge::  03/03/17  Admitting Physician:  Haley Fernandez MD  Discharge Physician:  Joselo Conley MD     Home clinic: [facility]          Admission Diagnoses:   Pelvic Mass   ASCUS + HPV          Discharge Diagnosis:   Suspect dermoid cyst, final pathology pending    Problem List  Patient Active Problem List   Diagnosis     Pelvic mass     Pre-operative examination     Dermoid cyst              Procedures:   Exploratory laparotomy, right salpignoophorectomy,TOPEL of abdominal mass, pelvic washings, colposcopy, cervical biopsies, endocervical curetting. Post op diagnosis of Dermoid cyst.           Medications Prior to Admission:     Prescriptions Prior to Admission   Medication Sig Dispense Refill Last Dose     imiquimod (ALDARA) 5 % cream Apply to affected areas qOD x 6 weeks.   Taking     norethindrone-ethinyl estradiol (JUNEL FE 1/20) 1-20 MG-MCG per tablet    Taking            Discharge Medications:     Medication Information                      HYDROcodone-acetaminophen (NORCO) 5-325 MG per tablet  Take 1-2 tablets by mouth every 4 hours as needed for moderate to severe pain             ibuprofen (ADVIL/MOTRIN) 600 MG tablet  Take 1 tablet (600 mg) by mouth every 6 hours             ibuprofen (ADVIL/MOTRIN) 600 MG tablet  Take 1 tablet (600 mg) by mouth every 6 hours as needed for moderate pain             imiquimod (ALDARA) 5 % cream  Apply to affected areas qOD x 6 weeks.             norethindrone-ethinyl estradiol (JUNEL FE 1/20) 1-20 MG-MCG per tablet               senna-docusate (SB DOCUSATE SODIUM/SENNA) 8.6-50 MG per tablet  Take 1 tablet by mouth 2 times daily as needed for constipation             senna-docusate (SENOKOT-S;PERICOLACE) 8.6-50 MG per tablet  Take 1-2 tablets by mouth 2 times daily Start with 1 tablet  PO BID, If no bowel movement in 24 hours, increase to 2 tablets PO BID.  Hold for loose stools. Preferred agent for constipation related to opioids.                       Consultations:   No consultations were requested during this admission          Brief History of Illness:     Ms Esparza initially presented to an OSH with LUQ pain, where a large mass was found. CT showed a 24cm pelvic mass extending to the upper abdomen and an elevated CA-125. She was referred to benign ObGyn, who performed a Pap-smear finding ASC-US and high risk HPV. She was then referred to gynecologic oncology.           Hospital Course:   Given the above listed history, surgical management was recommend. Informed consent was signed and the patient was admitted to the hospital on 3/1/2017. She underwent the procedures above.  The procedure was uncomplicated with an EBL of 20 mL. For full details of the procedure, please see her operative note. The remainder of her hospital course is detailed below in a problem based manner.    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 Final pathology is pending at the time of discharge.  She will follow-up postoperatively for a care plan with Dr. Fernandez on 3/30.  FEN:   - She was maintained on IVF until POD#1, when her diet was slowly advanced.  By discharge, she was tolerating a regular diet without nausea and vomiting and able to maintain her hydration without IVF supplementation.    Pain:   - Her pain was initially controlled on an epidural with PO medications. The epidural was discontinued on POD#1. Patient noted some lightheadedness with oxycodone and requested a different narcotic. She was given norco that controlled her pain, which she tolerated well. She was discharged home with her PO medications.  CV:    - Her vital signs were stable while hospitalized, and she had no acute CV issues.  PULM:   - She has no history of pulmonary  issues. She was initially given O2 supplementation in to maintain her oxygen saturation in the immediate postoperative period, and was transitioned off of this without difficulty.  By discharge, her O2 sats were greater than 94% on RA. She was encouraged to use her bedside IS while in house.  She had no acute pulmonary issues while in house.  HEME:   - Her preoperative hemoglobin was 12.3.  Her hemoglobin was stable at 9.7>9.6 at the time of discharge.  She had no other acute heme issues while in house. She did receive Lovenox for DVT prophylaxis while inpatient only.   GI:   - She was made NPO prior to the procedure. Her diet was advanced to clear liquids and then advanced slowly as tolerated.  At the time of discharge, she was tolerating a regular diet without nausea and vomiting.  She will be discharged with a bowel regimen to prevent constipation in the postoperative period.  She had no acute GI issues while in house.  :    -  A rock catheter was placed at the time of the surgery.  Once ambulating unassisted, the rock catheter was removed.  Prior to discharge, the patient was voiding spontaneously without difficulty.  She had no acute  issues while in house.  ID:   - The patient was afebrile during her hospitalization. She received standard preoperative antibiotics without incident.    ENDO:   - The patient had no endocrine issues while hospitalized.   PSYCH/NEURO:   - No issues during her hospitalization  PPX:    -  She was given SCDs, IS, PPI and Lovenox during her hospital course.  She tolerated these prophylactic interventions without incident.  They were discontinued at the time of her discharge.            Discharge Instructions and Follow-Up:   Discharge diet: Regular   Discharge activity: Lifting restricted to 15 pounds  No lifting, driving, or strenuous exercise for 6 week(s)  No heavy lifting for 6 week(s)   Discharge follow-up: 3/30/17 Dr Fernandez            Discharge Disposition:   Discharged to  home      Sylvia Tovar MD  OB/GYN, PGY1  03/03/17     Provider Disclosure:   I agree with above History, Review of Systems, Physical exam and Plan. I have reviewed the content of the documentation and have edited it as needed. I have personally performed the services documented here and the documentation accurately represents those services and the decisions I have made.     Electronically signed by:   Joselo Conley MD   Gynecologic Oncology   [facility]

## 2017-03-02 NOTE — PROGRESS NOTES
Post-Op Check      Paula Esparza is a 31 year old  F, POD#0 s/p XL, RSO, TOPEL of abdominal mass, pelvic washings, colposcopy, cervical biopsies, endocervical curetting. Post op diagnosis of Dermoid cyst.     S: Pt doing well with pain well controlled with an Epidural. Denies any nausea, shortness of breath and chest pain. She has not yet passed gas. Ambulating, tolerating coffee and water so far. No SOB, chest pain.     History  The patient has had ongoing LUQ pain that brought her to an OHS, where a large mass was found. CT showed a 24cm pelvic mass extending to the upper abdomen and an elevated CA-125. She was referred to benign ObGyn, who performed a Pap-smear finding ASC-US and high risk HPV. She was then referred to gynecologic oncology.     O:    Patient Vitals for the past 4 hrs:   BP Temp Temp src Heart Rate Resp SpO2   17 107/67 97.8  F (36.6  C) Oral 56 16 99 %   17 106/73 98  F (36.7  C) Oral 56 16 99 %   17 107/77 - - 60 16 100 %   17 109/76 - - 60 16 99 %   17 107/67 - - 53 16 100 %   17 1945 92/79 - - 50 16 100 %   17 1930 110/76 - - 57 16 100 %   17 1920 111/78 98.1  F (36.7  C) Oral - 16 100 %     General:  A&Ox3, NAD  CV:  Well perfused, regular rhythm  Pulm:  No increased work of breathing  Abd: soft, appropriately tender to palpation, minimally distended. No rebound or guarding  Incision: Vertical midline below umbilicus,   Castellanos: in place. Orange urine.   Lines: PIV, Castellanos    Labs:  None at this time.   Hgb 12.3>>>EBL 20 >pending.    A/P: 31 year old  F, POD#0 s/p XL, RSO, TOPEL of abdominal mass, pelvic washings, colposcopy, cervical biopsies, endocervical curetting. Post op diagnosis of Dermoid cyst. Doing well postoperatively.   1. FEN: clears, IVF 75cc/h, ADAT   2. Pain: Epidural, scheduled ibuprofen, tylenol, PRN roxicodone  3. CV: No issues. Currently stable.   4. Pulm: Current smoker. No issues.  5.  Heme: Minimal intraoperative blood loss and started at a normal Hgb. Check CBC in AM.  6. GI: Clears, ADAT. Bowel regimen,PRN anti-emetics  7. : Castellanos in place, remove on POD #1.   8. ID: Currently afebrile.   9. Endocrine: No issues.  10. Psych/Neuro: No issues  11. Prophylaxis: SCDs, IS, Lovenox POD#1 if Hgb stable. Continue only while inpatient.   12. Dispo: routine postoperative cares; d/c home when meeting post operative milestones.  13. Other: Daily alcohol use 5-7 drinks per day. Monitor for signs of withdrawal.   Joelle Larson, PGY2  OB/Gyn  3/1/2017, 10:30 PM

## 2017-03-02 NOTE — PROCEDURES
Operative Note    Date: 3/1/2017      Patient: Paula Esparza   MRN: [id number]    Pre-operative diagnosis:   - Pelvic Mass   - ASC-US pap smear, + high risk HPV     Post-operative diagnosis:   - Dermoid cyst      Procedure:  - Colposcopy   - Cervical biopsies   - Endocervical curettings   - Exploratory laparotomy   - Right salpingo-oophorectomy  - TOPEL of abdominal mass  - Pelvic washing     Surgeon: * Haley Fernandez MD - Primary     Assistants:   - Zhanna Kamara MD, Fellow   - Mar Mederos MD, PGY-3  - Florencia Schulz, MS3     Anesthesia: Combined General with Block      Estimated blood loss: 20 ml  IVF: 2000 ml crystalloid, 1000 ml colloid  UOP: 110 ml     Specimens:   ID Type Source Tests Collected by Time Destination   A : Endo Cervical Currettage Biopsy Cervix SURGICAL PATHOLOGY EXAM Haley Fernandez MD 3/1/2017 4:48 PM     B : Cervical Biopsy Biopsy Cervix SURGICAL PATHOLOGY EXAM Haley Fernandez MD 3/1/2017 4:49 PM     C : pelvic washing Washings Pelvis CYTOLOGY NON GYN Haley Fernandez MD 3/1/2017 5:56 PM     D : Right Fallopian tube and overy Tissue Fallopian Tube and Ovary, Right SURGICAL PATHOLOGY EXAM Haley Fernandez MD 3/1/2017 5:58 PM     E : Portion of right Fallopian tube Tissue Fallopian Tube, Right SURGICAL PATHOLOGY EXAM Haley Fernandez MD 3/1/2017 6:08 PM        Findings: EUA revealed nulliparous cervix without lesions. Uterus not appreciated secondary to distended abdomen consistent with imaging of large cystic abdominal mass which is palpated to just under the xyphoid process. Colposcopy was not satisfactory. Minimal AW changes at 6 to 7 o'clock which was biopsied, ECC also obtained. Entry into the abdomen revealed large mass consistent with ovarian etiology. Upon placement of TOPEL device, thick sebaceous appearing fluid was returned, a total of 5200cc was removed. Once right salpingo-oophorectomy was completed, normal appearing uterus and left fallopian tube  and ovary. No evidence of nodules on liver, spleen, diaphragm, omentum or bowel.     Complications:None.     Implants: None.    Indications: Paula Esparza is a 31 year old  woman with a recent diagnosis of pelvic mass. Given these findings she was subsequently sent to the [facility]ologic Cancer [facility] for new patient consultation. The patient was seen at Urgent care on 2/15/17 for concerns of progressively worsening LUQ abdominal pain over the week prior.  Subsequent imaging (AXR and CT scan) and laboratory testing was notable for an elevated CA-125 an 24 cm pelvic mass extending to the upper abdomen. She followed up with benign OB/GYN at which time pap smear was obtained (see above) then subsequently referred.  After initial consultion with the patient on 17, discussion was had regarding surgical resection by way of exploratory laparotomy, Topel of mass, oophorectomy, possible pelvic and paraaortic lymph node dissection, possible cancer staging. In addition, we recommended colposcopy with possible cervical biopsies and possible LEEP.     Procedure:  The patient was taken to the operating room where she underwent general endotrachael anesthesia without difficulty. She was placed in the doral lithotomy position. At this time, speculum was placed and 5% acetic acid was placed on there cervix.  Gross examination of the vulva was unremarkable.  Colposcopy of the cervix was then completed and was unsatisfactory. Cervical biopsy at 7 o'clock was obtained and the site made hemostatic with silver nitrate and electrocautery.  She was then  prepped and draped in the usual sterile fashion. A vertical midline skin incision was made approximately 2 cm above the symphysis pubis and extended with cautery through the rectus fascia and the peritoneal cavity was entered in the midline by both blunt and sharp dissection.    The pelvis was examined with the above noted findings.  At this time, the TOPEL device was used to  penetrate the cystic abdominal mass with return of thick, sebaceous appearing fluid.  After a prolonged period of time with minimal return of fluid, the defect in the cyst was extended with electrocautery with incidental spilling of contents into the abdomen.  After further suction removal of the contents (approximately 5200cc), the mass was removed from the abdomen at which time it was confirmed it was ovarian in origin.  At this time, the right infundibulopelvic ligament and ureter was identified. A defect was made in an avascular plane of the mesosalpinx, then the IP ligament was   doubly clamped, transected and suture ligated with 2-0 Vicryl.  THe right ovary and fallopian tube was then sent to pathology which returned as dermoid cyst. Hemostasis was visualized and the abdomen was copiously irrigated.  All laparotomy sponges and instruments were removed from the abdomen. The facia was closed with running 0-PDS and hemostasis was assured. The skin was irrigated and the subcutaneous tissue was re-approximated with inturrupted sutures of 2-0 Vicryl.  The skin was closed with 4-0 Monocryl.  Benzoin and steri-strips applied.    Sponge, lap, instrument and needle counts were correct x 2. The patient was taken to the PACU in stable condition. Dr. Fernandez was present and scrubbed for the entire procedure.     Mar Mederos MD   OB/Gyn Resident, PGY-3  March 1, 2017, 7:41 PM

## 2017-03-02 NOTE — ANESTHESIA CARE TRANSFER NOTE
Patient: Paula Esparza    Procedure(s):  Exploratory Laparotomy, Right Salpingo-Oophorectomy, Topel Of Abdominal Mass, Pelvic Washings Anesthesia general with Block  - Wound Class: II-Clean Contaminated    Diagnosis: Pelvic Mass   Diagnosis Additional Information: No value filed.    Anesthesia Type:   General, ETT     Note:  Airway :Nasal Cannula  Patient transferred to:PACU  Comments: Pt transferred to PACU.  Maintaining airway and oxygenation via NC.  VSS.  Pt alert and oriented.  Report to RN.  All questions answered.       Vitals: (Last set prior to Anesthesia Care Transfer)    CRNA VITALS  3/1/2017 1847 - 3/1/2017 1929      3/1/2017             Resp Rate (set): 10                Electronically Signed By: LETICIA Medley CRNA  March 1, 2017  7:29 PM

## 2017-03-02 NOTE — PLAN OF CARE
Problem: Goal Outcome Summary  Goal: Goal Outcome Summary  Outcome: No Change  Bradycardic (lowest HR 47) and hypotensive; MDs aware of both, no changes to orders, continue to monitor. OVSS on RA. Pain well managed w/ scheduled tylenol, ibuprofen and epidural. PRN oxycodone given x1 prophylacticly but pt has not had any c/o pain. Denies nausea, scope patch in place and receiving scheduled zofran. Tolerating clears, good oral intake. Reports she is passing gas. Rock in place w/ good UOP, orange in color, now clearing to yellow. Order to remove rock on POD 1, per on-call MD keep in place until after rounds d/t epidural. MIVF infusing to PIV. Operative dressing in place, drainage marked. Using IS. Will continue POC.

## 2017-03-02 NOTE — ANESTHESIA POST-OP FOLLOW-UP NOTE
REGIONAL ANESTHESIA PAIN SERVICE EPIDURAL NOTE  SUBJECTIVE:   Interval History: Pt reports adequate pain control via epidural. Denies any weakness, paresthesias, circumoral numbness, metallic taste or tinnitus. Pt is ambulating with assistance. Patient is currently without nausea; without pruritis. Scopolomine patch in placel Currently tolerating a CL diet.      Clinically Aligned Pain Assessment (CAPA):  Comfort (How is your pain?): Tolerable with discomfort  Change in Pain (Since your last medication/intervention?): About the same  Pain Control (How are your pain treatments working?): Partially effective pain control  Functioning (Are you able to do activities to get better?) : Can do most things, but pain gets in the way of some   Sleep (Does your pain management allow you to sleep or rest?): Awake with occasional pain        Anticoagulation: none        OBJECTIVE:  Diagnostics:        Lab Results   Component Value Date     WBC 9.9 03/02/2017            Lab Results   Component Value Date     RBC 3.99 03/02/2017            Lab Results   Component Value Date     HGB 10.7 03/02/2017            Lab Results   Component Value Date     HCT 32.7 03/02/2017            Lab Results   Component Value Date      03/02/2017         No results found for: INR     Vitals:  Temp: [97.8  F (36.6  C)-98.9  F (37.2  C)] 98.3  F (36.8  C)  Pulse: [53-83] 53  Heart Rate: [50-87] 61  Resp: [11-22] 16  BP: ()/(50-97) 102/69  MAP: [86 mmHg-131 mmHg] 131 mmHg  Arterial Line BP: (124-160)/() 160/108  SpO2: [98 %-100 %] 100 %     Exam:   Strength 5/5 and symmetric grossly in bilateral LE      Epidural site with dressing c/d/i, no tenderness, erythema, heme, edema        ASSESSMENT/PLAN:   Paula Esparza is a 31 year old female POD #1 s/p SALPINGO-OOPHORECTOMY, DISSECT LYMPH NODE and placement of T10-11 epidural for analgesia. Pt receiving adequate analgesia with epidural infusion Bupivacaine 0.125% at 10mL/hour. Pt reports  it felt like legs were going to buckle when she stood. Weakness in lower extremities likely from epidural spread. Stopped epidural infusion at 0920. Discussed epidural management with Jocelyn Tovar APRN, GYN/Onc and if pain well managed on oxycodone will DC epidural this afternoon. Pt may discharge tomorrow. Discussed with patient she can take oxycodone 5-10mg po q 3 hrs. Pt acknowledged understanding.     - epidural infusion stopped at 0920 due to lower extremity weakness   - 1200 no significant change in pain - epidural catheter was DC'd dark tip intact without complication  - may give lovenox at 1400 or later today  - will sign off/call for questions or concerns        Fredis Rose   [facility] Anest[facility] Pain Service  3/2/2017 2:58 PM      24 hour Job Code Pager. For in-house use only.   Dial * * [phone] and  [place]: [phone]  [place]: [phone]  Peds: [phone]  Then enter call-back number  May text page using [employer], but NOT [employer].

## 2017-03-03 VITALS
WEIGHT: 138.6 LBS | TEMPERATURE: 97.8 F | OXYGEN SATURATION: 100 % | SYSTOLIC BLOOD PRESSURE: 105 MMHG | DIASTOLIC BLOOD PRESSURE: 64 MMHG | BODY MASS INDEX: 23.66 KG/M2 | HEIGHT: 64 IN | HEART RATE: 59 BPM | RESPIRATION RATE: 14 BRPM

## 2017-03-03 LAB
COPATH REPORT: NORMAL
HGB BLD-MCNC: 9.6 G/DL (ref 11.7–15.7)
HGB BLD-MCNC: 9.7 G/DL (ref 11.7–15.7)

## 2017-03-03 PROCEDURE — 25000132 ZZH RX MED GY IP 250 OP 250 PS 637: Performed by: OBSTETRICS & GYNECOLOGY

## 2017-03-03 PROCEDURE — 25000132 ZZH RX MED GY IP 250 OP 250 PS 637: Performed by: NURSE PRACTITIONER

## 2017-03-03 PROCEDURE — 85018 HEMOGLOBIN: CPT | Performed by: NURSE PRACTITIONER

## 2017-03-03 PROCEDURE — 36415 COLL VENOUS BLD VENIPUNCTURE: CPT | Performed by: NURSE PRACTITIONER

## 2017-03-03 PROCEDURE — 36415 COLL VENOUS BLD VENIPUNCTURE: CPT | Performed by: OBSTETRICS & GYNECOLOGY

## 2017-03-03 PROCEDURE — 85018 HEMOGLOBIN: CPT | Performed by: OBSTETRICS & GYNECOLOGY

## 2017-03-03 PROCEDURE — 99024 POSTOP FOLLOW-UP VISIT: CPT | Mod: GC | Performed by: OBSTETRICS & GYNECOLOGY

## 2017-03-03 RX ORDER — IBUPROFEN 600 MG/1
600 TABLET, FILM COATED ORAL EVERY 6 HOURS
Qty: 60 TABLET | Refills: 0 | Status: SHIPPED | OUTPATIENT
Start: 2017-03-03 | End: 2017-03-15

## 2017-03-03 RX ORDER — AMOXICILLIN 250 MG
1 CAPSULE ORAL 2 TIMES DAILY PRN
Qty: 60 TABLET | Refills: 1 | Status: SHIPPED
Start: 2017-03-03 | End: 2017-03-15

## 2017-03-03 RX ORDER — IBUPROFEN 600 MG/1
600 TABLET, FILM COATED ORAL EVERY 6 HOURS PRN
Qty: 40 TABLET | Refills: 1 | Status: SHIPPED
Start: 2017-03-03 | End: 2017-03-15

## 2017-03-03 RX ORDER — HYDROCODONE BITARTRATE AND ACETAMINOPHEN 5; 325 MG/1; MG/1
1-2 TABLET ORAL EVERY 4 HOURS PRN
Qty: 25 TABLET | Refills: 0 | Status: SHIPPED | OUTPATIENT
Start: 2017-03-03

## 2017-03-03 RX ORDER — HYDROCODONE BITARTRATE AND ACETAMINOPHEN 5; 325 MG/1; MG/1
1-2 TABLET ORAL EVERY 4 HOURS PRN
Status: DISCONTINUED | OUTPATIENT
Start: 2017-03-03 | End: 2017-03-03 | Stop reason: HOSPADM

## 2017-03-03 RX ORDER — AMOXICILLIN 250 MG
1-2 CAPSULE ORAL 2 TIMES DAILY
Qty: 100 TABLET | Refills: 0 | Status: SHIPPED
Start: 2017-03-03 | End: 2017-03-15

## 2017-03-03 RX ADMIN — ACETAMINOPHEN 650 MG: 325 TABLET, FILM COATED ORAL at 11:46

## 2017-03-03 RX ADMIN — ACETAMINOPHEN 650 MG: 325 TABLET, FILM COATED ORAL at 06:00

## 2017-03-03 RX ADMIN — SENNOSIDES AND DOCUSATE SODIUM 1 TABLET: 8.6; 5 TABLET ORAL at 08:26

## 2017-03-03 RX ADMIN — ACETAMINOPHEN 650 MG: 325 TABLET, FILM COATED ORAL at 00:09

## 2017-03-03 RX ADMIN — OXYCODONE HYDROCHLORIDE 10 MG: 5 TABLET ORAL at 00:09

## 2017-03-03 RX ADMIN — IBUPROFEN 600 MG: 600 TABLET ORAL at 03:12

## 2017-03-03 RX ADMIN — HYDROCODONE BITARTRATE AND ACETAMINOPHEN 1 TABLET: 5; 325 TABLET ORAL at 13:12

## 2017-03-03 RX ADMIN — OXYCODONE HYDROCHLORIDE 10 MG: 5 TABLET ORAL at 03:12

## 2017-03-03 RX ADMIN — HYDROCODONE BITARTRATE AND ACETAMINOPHEN 1 TABLET: 5; 325 TABLET ORAL at 17:59

## 2017-03-03 RX ADMIN — OXYCODONE HYDROCHLORIDE 10 MG: 5 TABLET ORAL at 06:00

## 2017-03-03 RX ADMIN — OXYCODONE HYDROCHLORIDE 10 MG: 5 TABLET ORAL at 09:14

## 2017-03-03 RX ADMIN — IBUPROFEN 600 MG: 600 TABLET ORAL at 15:11

## 2017-03-03 RX ADMIN — IBUPROFEN 600 MG: 600 TABLET ORAL at 08:26

## 2017-03-03 RX ADMIN — SENNOSIDES AND DOCUSATE SODIUM 1 TABLET: 8.6; 5 TABLET ORAL at 11:47

## 2017-03-03 NOTE — PROGRESS NOTES
"..BP 93/61  Pulse 59  Temp 98.2  F (36.8  C)  Resp 16  Ht 1.638 m (5' 4.49\")  Wt 62.9 kg (138 lb 9.6 oz)  LMP 02/21/2017  SpO2 98%  BMI 23.43 kg/m2  A&O. VSS. Eating and drinking well. Up to BR voiding. DC'd peripheral IV for tenderness. For possible dc today per team on rounds. Incisional pain decreased with oral oxy and scheduled motrin. Note: Pt had some concerns about taking oral Oxy, MD paged and we are going to try Norco.  Continue with plan of cares and medicate per orders.  "

## 2017-03-03 NOTE — PROGRESS NOTES
Gynecologic Oncology Daily Progress Note  3/4/17    S: Reports feeling well this morning. Had some blurry vision last night that has resolved. Eating and drinking well with no nausea or vomiting. Passing flatus and urinating well, no BM. Ambulating well.     O:  Vitals:    03/02/17 2013 03/03/17 0008 03/03/17 0308 03/03/17 0404   BP: (!) 81/50 (!) 87/52 (!) 79/54 91/56   BP Location: Left arm Right arm Right arm Right arm   Pulse: 51 59     Resp: 16 16 16    Temp: 97.9  F (36.6  C) 98.2  F (36.8  C) 97.8  F (36.6  C)    TempSrc: Oral Oral Oral    SpO2: 97% 100% 97%    Weight:       Height:           Gen: NAD, A&Ox3  Resp: NLB, no cough or wheezing  Abdomen: Non-distended, appropriately tender to palpation. Bowel sounds normal. No guarding or rebound tenderness.   Incision: Clean and intact, mild serosanguinous leakage.   Extremities: WWP, non edematous    I/O last 3 completed shifts:  In: 4100 [P.O.:4100]  Out: 3050 [Urine:3050]    ROUTINE IP LABS (Last four results)  CBC  Recent Labs  Lab 03/03/17  0447 03/02/17  0422   WBC  --  9.9   RBC  --  3.99   HGB 9.7* 10.7*   HCT  --  32.7*   MCV  --  82   MCH  --  26.8   MCHC  --  32.7   RDW  --  16.1*   PLT  --  205     A/P: 31 year old with history of enlarging pelvic mass POD #2 s/p XL, RSO, TOPEL of abdominal mass, washings. Dermoid cyst on frozen, final path still pending. Doing well post-operatively.     FEN: Reg diet  Pain:  S/p epidural, APAP, ibuprofen, PRN roxicodone  Heme:  Hgb 12.3-> EBL 20-> 10.7 -> 9.7  CV: No issues  Pulm:  Smoker, no issues  GI:  Bowel regimen, PRN anti-emetics  : s/p rock, voiding spontaneously  ID: Afebrile  Endo: No issues  Neuro/Psych: qD ETOH use, MSSA ordered. Asymptomatic  PPX:  S/p 1x lovanox. SCDs.   Dispo:  Discharge to home likely today.       3/3/2017 5:55 AM      Provider Disclosure:   I agree with above History, Review of Systems, Physical exam and Plan. I have reviewed the content of the documentation and have edited it as  needed. I have personally performed the services documented here and the documentation accurately represents those services and the decisions I have made.     Electronically signed by:   Joselo Conley MD   Gynecologic Oncology   [facility]

## 2017-03-03 NOTE — PLAN OF CARE
Problem: Individualization  Goal: Patient Preferences  Outcome: Improving  Hypotensive (see VS flowsheet) but within ordered parameters. Other VSS on RA. Pain well controlled with PRN oxycodone x2. Denies nausea; tolerating regular diet. PIV saline locked. Adequate UOP and no BM reported. Refused scheduled suppository this afternoon but received senna as ordered; is passing gas. Multiple friends at bedside this evening; very supportive and uplifting. Will continue with plan of care.

## 2017-03-03 NOTE — PROGRESS NOTES
Evening progress note  Paula Esparza, [id number]    POD#1 from XL, RSO for very large dermoid cyst.     Ms Esparza feels well, few concerns this evening. Mentions muscle spasms in her lateral abdomen/back. Also blurry vision since surgery which is improving. She's been walking a lot, tolerating a light regular diet.     Patient Vitals for the past 6 hrs:   BP Temp Temp src Pulse Heart Rate Resp SpO2   03/02/17 2013 (!) 81/50 97.9  F (36.6  C) Oral 51 - 16 97 %   03/02/17 1523 90/55 98.1  F (36.7  C) Oral 72 72 16 98 %     Gen: alert, NAD    A/P  POD#0 from XL, RSO for large dermoid cyst  - Hypotension - likely reflects small departure from low baseline BP. As she is asymptomatic and Hgb decrease appropriate, and UOP excellent, she   - Blurry vision - likely related to anesthesia, taping eyes in surgery, possibly pain medications. Improving.   - Muscle spasms - discussed that she is probably starting to use alternative muscles to compensate for her incision  - EtOH use - no s/sx withdrawal  - Dispo - likely tomorrow, pt comfortable with this plan    Joelle Larson, PGY2  OB/Gyn  3/2/2017, 8:46 PM

## 2017-03-03 NOTE — PLAN OF CARE
Problem: Goal Outcome Summary  Goal: Goal Outcome Summary  Patient AVSS. Transferred from 7A to 7B. Patient up ad sean. Ambulated in hallway.  Lap site to abdomen with steri strips dry and intact. Denies pain. MSSA of 1.     Patient dc'd to home in the evening. Discharge orders reviewed with patient. BP still low. MD on call updated, and stated ok for patient to discharge. Patient instructed to stay hydrated and call if symptomatic and to follow up with primary for bp check. Patient will follow up end of March for post op check. Midline incision with small amount of serosang drainage. Patient given dressing supplies. Encouraged to call if drainage gets worse.

## 2017-03-03 NOTE — PROVIDER NOTIFICATION
Notified MD via  Pager that pt is not feeling great, resting in bed. BP= 84/50 , Hbg = 9.6. Changed pain med to [place] and spoke with Pharmacist and only gave 1 tab due to the fact pt had received tylenol earlier. Continue to monitor closely and medicate per orders.

## 2017-03-03 NOTE — PROGRESS NOTES
Gynecology Oncology Progress Note    Paula Esparza is a 31 year old  F, POD#2 s/p XL, RSO, TOPEL of abdominal mass, pelvic washings, colposcopy, cervical biopsies, endocervical curetting. Post op diagnosis of Dermoid cyst.     Dz: Large dermoid cyst, ASCUS +HPV    24-hour events:   - Meeting postoperative goals    Subjective: Feeling well. Pain controlled with oxycodone. Complains of improving blurry vision. Passing flatus. No BM yet. Tolerating PO.     Objective:   Patient Vitals for the past 3 hrs:   BP Temp Heart Rate Resp SpO2   17 0719 93/61 98.2  F (36.8  C) 60 16 98 %     General: Comfortable, NAD.   CV: RRR, no m/r/g  Resp: CTAB; RA  Abdomen: soft, appropriately tender, nondistended, + BS  Incision: c/d/i, intact bandage minimal drainage.   Extremities: nontender, no edema, SCDs in place    I&Os  24 Hrs // Since MN  PO 4100 mL // 0 recorded  UOP 3050 mL // 1080 mL      New labs/imaging:   Hemoglobin   Date Value Ref Range Status   2017 9.7 (L) 11.7 - 15.7 g/dL Final       Assessment: Paula Esparza is a 31 year old  F, POD#2 s/p XL, RSO, TOPEL of abdominal mass, pelvic washings, colposcopy, cervical biopsies, endocervical curetting. Post op diagnosis of Dermoid cyst.     Active Problem list:  - Postoperative state    31 year old  F, POD#2 s/p XL, RSO, TOPEL of abdominal mass, pelvic washings, colposcopy, cervical biopsies, endocervical curetting. Post op diagnosis of Dermoid cyst. Doing well postoperatively.   FEN: IVF discontinued yesterday, tolerating regular diet   Pain: Scheduled ibuprofen, tylenol, PRN roxicodone. S/p epidural.   CV: No issues. Continues to be hypotensive, but completely asymptomatic. Likely low-normal baseline. Currently stable.   Pulm: Current smoker. No issues.  Heme: Appropriate Hgb decrease. Lovenox started 3/2 AM, will not require at discharge.   GI: Tolerating regular diet. Bowel regimen, PRN anti-emetics  : s/p Castellanos, voiding spontaneously.    ID: Currently afebrile.   Endocrine: No issues.  Psych/Neuro: No issues  Prophylaxis: SCDs, IS, has received Lovenox, plan to continue only while inpatient.   Dispo: routine postoperative cares; d/c home when meeting post operative milestones.  Other: Daily alcohol use 5-7 drinks per day. Monitor for signs of withdrawal ([facility] protocol ordered); no current signs or symptoms.     Joelle Larson MD  OBGYN, PGY2  Gyn Onc Pager [phone]    Provider Disclosure:   I agree with above History, Review of Systems, Physical exam and Plan. I have reviewed the content of the documentation and have edited it as needed. I have personally performed the services documented here and the documentation accurately represents those services and the decisions I have made.     Electronically signed by:   Joselo Conley MD   Gynecologic Oncology   [facility]

## 2017-03-03 NOTE — PLAN OF CARE
Problem: Goal Outcome Summary  Goal: Goal Outcome Summary  Outcome: Improving  Order is to transfer patient from 7A to 7B.  Report given to Mamta ( 7B RN ).  Patient is stable and ready to be transferred.

## 2017-03-03 NOTE — PLAN OF CARE
Problem: Goal Outcome Summary  Goal: Goal Outcome Summary  Outcome: No Change  BPs soft. HR 50's-60's. OVSS on RA. Alert and oriented. Up independently. Pain adequately controlled with scheduled Tylenol and ibuprofen, and 10mg oxy x3. Denies nausea. MSSA score of 2. PIV saline locked. Voiding, no BM this shift. Will continue to monitor and notify of any changes.

## 2017-03-07 ENCOUNTER — CARE COORDINATION (OUTPATIENT)
Dept: CARDIOLOGY | Facility: CLINIC | Age: 32
End: 2017-03-07

## 2017-03-07 NOTE — PROGRESS NOTES
"[facility]  \"Hello, my name is Melissa Yin , and I am calling from the [facility].  I want to check in and see how you are doing, after leaving the hospital.  You may also receive a call from your Care Coordinator (care team), but I want to make sure you don t have any urgent needs.  I have a couple questions to review with you:     Post-Discharge Outreach                                                    Paula Esparza is a 31 year old female     Follow-up Appointments           Adult [facility] Follow-up and recommended labs and tests       3/30/17 2 pm Dr Fernandez Post op visit. Call prior to then for questions or concerns [phone]                  Adult [facility] Follow-up and recommended labs and tests       You have a follow-up visit scheduled with Dr. Fernandez on 3/30/17.                       Your next 10 appointments already scheduled            Mar 30, 2017 2:00 PM CDT   (Arrive by 1:45 PM)   Post-Op with Haley Fernandez MD   [facility]     [address] [place] [address] [id number] [phone]              Care Team:    Patient Care Team       Relationship Specialty Notifications Start End    No Ref-Primary, Physician PCP - General   3/1/17     Comment:  No PCP or PCC per pt.            Transition of Care Review                                                      Did you have a surgery or procedure during your hospital visit? Yes   If yes, do you have any of the following:     Signs of infection:  No    Pain:  Yes     Pain Scale (0-10) 3/10     Location: Incision area    Wound/incision concerns? Per patient \"NO\" but she did mention to me that it was bleeding a little and it looked like her stitiches were pulling apart but she has been keeping them together with sterile strips.     Do you have all of your medications/refills?  Yes    Are you having any side effects or questions about " your medication(s)? Yes- she said she has picked up the hydrocodone because when they gave her a shot in the hospital she did not like how it made her feel.     Do you have any new or worsening symptoms?  No    Do you have any future appointments scheduled?   YES             Plan                                                      Thanks for your time.  Your Care Coordinator may follow-up within the next couple days.  In the meantime if you have questions, concerns or problems call your care team.        Melissa Yin

## 2017-03-08 ENCOUNTER — CARE COORDINATION (OUTPATIENT)
Dept: ONCOLOGY | Facility: CLINIC | Age: 32
End: 2017-03-08

## 2017-03-08 LAB — COPATH REPORT: NORMAL

## 2017-03-08 NOTE — OP NOTE
Operative Note    Date: 3/1/2017      Patient: Paula Esparza   MRN: [id number]    Pre-operative diagnosis:   - Pelvic Mass   - ASC-US pap smear, + high risk HPV     Post-operative diagnosis:   - Dermoid cyst      Procedure:  - Colposcopy   - Cervical biopsies   - Endocervical curettings   - Exploratory laparotomy   - Right salpingo-oophorectomy  - TOPEL of abdominal mass  - Pelvic washing     Surgeon: * Haley Fernandez MD - Primary     Assistants:   - Zhanna Kamara MD, Fellow   - Mar Mederos MD, PGY-3  - Florencia Schulz, MS3     Anesthesia: Combined General with Block      Estimated blood loss: 20 ml  IVF: 2000 ml crystalloid, 1000 ml colloid  UOP: 110 ml     Specimens:   ID Type Source Tests Collected by Time Destination   A : Endo Cervical Currettage Biopsy Cervix SURGICAL PATHOLOGY EXAM Haley Fernandez MD 3/1/2017 4:48 PM     B : Cervical Biopsy Biopsy Cervix SURGICAL PATHOLOGY EXAM Haley Fernandez MD 3/1/2017 4:49 PM     C : pelvic washing Washings Pelvis CYTOLOGY NON GYN Haley Fernandez MD 3/1/2017 5:56 PM     D : Right Fallopian tube and overy Tissue Fallopian Tube and Ovary, Right SURGICAL PATHOLOGY EXAM Haley Fernandez MD 3/1/2017 5:58 PM     E : Portion of right Fallopian tube Tissue Fallopian Tube, Right SURGICAL PATHOLOGY EXAM Haley Fernandez MD 3/1/2017 6:08 PM        Findings: EUA revealed nulliparous cervix without lesions. Uterus not appreciated secondary to distended abdomen consistent with imaging of large cystic abdominal mass which is palpated to just under the xyphoid process. Colposcopy was not satisfactory. Minimal AW changes at 6 to 7 o'clock which was biopsied, ECC also obtained. Entry into the abdomen revealed large mass consistent with ovarian etiology. Upon placement of TOPEL device, thick sebaceous appearing fluid was returned, a total of 5200cc was removed. Once right salpingo-oophorectomy was completed, normal appearing uterus and left fallopian tube  and ovary. No evidence of nodules on liver, spleen, diaphragm, omentum or bowel.     Complications:None.     Implants: None.    Indications: Paula Esparza is a 31 year old  woman with a recent diagnosis of pelvic mass. Given these findings she was subsequently sent to the [facility] for new patient consultation. The patient was seen at Urgent care on 2/15/17 for concerns of progressively worsening LUQ abdominal pain over the week prior.  Subsequent imaging (AXR and CT scan) and laboratory testing was notable for an elevated CA-125 an 24 cm pelvic mass extending to the upper abdomen. She followed up with benign OB/GYN at which time pap smear was obtained (see above) then subsequently referred.  After initial consultion with the patient on 17, discussion was had regarding surgical resection by way of exploratory laparotomy, Topel of mass, oophorectomy, possible pelvic and paraaortic lymph node dissection, possible cancer staging. In addition, we recommended colposcopy with possible cervical biopsies and possible LEEP.     Procedure:  The patient was taken to the operating room where she underwent general endotrachael anesthesia without difficulty. She was placed in the doral lithotomy position. At this time, speculum was placed and 5% acetic acid was placed on there cervix.  Gross examination of the vulva was unremarkable.  Colposcopy of the cervix was then completed and was unsatisfactory. Cervical biopsy at 7 o'clock was obtained and the site made hemostatic with silver nitrate and electrocautery.  She was then  prepped and draped in the usual sterile fashion. A vertical midline skin incision was made approximately 2 cm above the symphysis pubis and extended with cautery through the rectus fascia and the peritoneal cavity was entered in the midline by both blunt and sharp dissection.    The pelvis was examined with the above noted findings.  At this time, the TOPEL device was used to  penetrate the cystic abdominal mass with return of thick, sebaceous appearing fluid.  After a prolonged period of time with minimal return of fluid, the defect in the cyst was extended with electrocautery with incidental spilling of contents into the abdomen.  After further suction removal of the contents (approximately 5200cc), the mass was removed from the abdomen at which time it was confirmed it was ovarian in origin.  At this time, the right infundibulopelvic ligament and ureter was identified. A defect was made in an avascular plane of the mesosalpinx, then the IP ligament was   doubly clamped, transected and suture ligated with 2-0 Vicryl.  THe right ovary and fallopian tube was then sent to pathology which returned as dermoid cyst. Hemostasis was visualized and the abdomen was copiously irrigated.  All laparotomy sponges and instruments were removed from the abdomen. The facia was closed with running 0-PDS and hemostasis was assured. The skin was irrigated and the subcutaneous tissue was re-approximated with inturrupted sutures of 2-0 Vicryl.  The skin was closed with 4-0 Monocryl.  Benzoin and steri-strips applied.    Sponge, lap, instrument and needle counts were correct x 2. The patient was taken to the PACU in stable condition. Dr. Fernandez was present and scrubbed for the entire procedure.     Mar Mederos MD   OB/Gyn Resident, PGY-3  March 1, 2017, 7:41 PM      Haley Fernandez MD    Department of Ob/Gyn and Women's Health  Division of Gynecologic Oncology  [facility]  [phone]    I saw and evaluated the patient with the fellow. I edited and reviewed the above note.

## 2017-03-15 ENCOUNTER — OFFICE VISIT (OUTPATIENT)
Dept: ONCOLOGY | Facility: CLINIC | Age: 32
End: 2017-03-15
Attending: OBSTETRICS & GYNECOLOGY
Payer: COMMERCIAL

## 2017-03-15 VITALS
BODY MASS INDEX: 24.5 KG/M2 | RESPIRATION RATE: 14 BRPM | OXYGEN SATURATION: 100 % | DIASTOLIC BLOOD PRESSURE: 68 MMHG | SYSTOLIC BLOOD PRESSURE: 118 MMHG | WEIGHT: 144.9 LBS | HEART RATE: 90 BPM | TEMPERATURE: 98 F

## 2017-03-15 DIAGNOSIS — T81.31XA POSTOPERATIVE WOUND DEHISCENCE, INITIAL ENCOUNTER: ICD-10-CM

## 2017-03-15 DIAGNOSIS — D36.9 DERMOID CYST: ICD-10-CM

## 2017-03-15 DIAGNOSIS — Z98.890 POSTOPERATIVE STATE: Primary | ICD-10-CM

## 2017-03-15 DIAGNOSIS — N87.0 DYSPLASIA OF CERVIX, LOW GRADE (CIN 1): ICD-10-CM

## 2017-03-15 PROCEDURE — 99024 POSTOP FOLLOW-UP VISIT: CPT | Mod: ZP | Performed by: NURSE PRACTITIONER

## 2017-03-15 PROCEDURE — 99212 OFFICE O/P EST SF 10 MIN: CPT | Mod: ZF

## 2017-03-15 RX ORDER — IBUPROFEN 600 MG/1
600 TABLET, FILM COATED ORAL EVERY 6 HOURS
Qty: 60 TABLET | Refills: 0 | Status: SHIPPED | OUTPATIENT
Start: 2017-03-15

## 2017-03-15 ASSESSMENT — PAIN SCALES - GENERAL: PAINLEVEL: NO PAIN (0)

## 2017-03-15 NOTE — PROGRESS NOTES
"Gynecologic Oncology Follow-Up Visit  RE: Paula Esparza  MRN: [id number]  : 1985  Date of Visit: 03/15/2017    CC: Paula Esparza is a 31 year old  female with a history of a pelvic mass s/p exploratory laparotomy, RSO, TOPEL of abdominal mass, colposcopy, cervical biopsies, ECC, and pelvic washings performed on 3/1/17. She presents today for a post-operative visit.    HPI:  Paula comes to the clinic feeling well. She has had manageable post-operative \"tenderness\" to her incision which she manages with Tylenol and ibuprofen. She is concerned about her incision as she feels her pants have been rubbing on it and caused it to open up. She has been keeping the area clean and denies significant pain, erythema, edema, or drainage. No fever or chills. She has been following post-operative restrictions and pelvic rest.    Oncology History:  3/1/17: Exploratory laparotomy, RSO, TOPEL of abdominal mass, colposcopy, cervical biopsies, ECC, and pelvic washings    FINAL DIAGNOSIS:   A. ENDOCERVIX, CURETTAGE:   - Benign proliferative endometrium, without hyperplasia or atypia   - Superficial strips of benign, non-dysplastic endocervical epithelium     B. CERVIX, COLPOSCOPIC BIOPSY:   - Low grade squamous intraepithelial lesion (cervical intraepithelial   neoplasia 1)   - Transformation zone present     D. 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 RIGHT FALLOPIAN TUBE, PORTION, EXCISION:   - Unremarkable proximal portion of fallopian tube, and fibrovascular   tissue         Past Medical History   Diagnosis Date     Depression      Not formally diagnosed     Past Surgical History   Procedure Laterality Date     Salpingo oophorectomy,r/l/chapis  3/1/2017           Salpingo-oophorectomy, combined Right 3/1/2017     Procedure: COMBINED SALPINGO-OOPHORECTOMY;  " Surgeon: Haley Fernandez MD;  Location:  OR     Family History   Problem Relation Age of Onset     CANCER Mother      Tongue      Coronary Artery Disease Early Onset Father      DIABETES Father      Breast Cancer Maternal Grandmother      Survivor     Coronary Artery Disease Maternal Grandmother      Breast Cancer Paternal Grandmother      Suvivor      Social History     Social History     Marital status: Single     Spouse name: N/A     Number of children: N/A     Years of education: N/A     Social History Main Topics     Smoking status: Current Every Day Smoker     Smokeless tobacco: Never Used     Alcohol use Yes      Comment: socially     Drug use: No     Sexual activity: Not on file     Other Topics Concern     Not on file     Social History Narrative       Current Outpatient Prescriptions   Medication     ibuprofen (ADVIL/MOTRIN) 600 MG tablet     senna-docusate (SENOKOT-S;PERICOLACE) 8.6-50 MG per tablet     ibuprofen (ADVIL/MOTRIN) 600 MG tablet     HYDROcodone-acetaminophen (NORCO) 5-325 MG per tablet     senna-docusate (SB DOCUSATE SODIUM/SENNA) 8.6-50 MG per tablet     imiquimod (ALDARA) 5 % cream     norethindrone-ethinyl estradiol (JUNEL FE 1/20) 1-20 MG-MCG per tablet     No current facility-administered medications for this visit.       No Known Allergies    ROS  General: Denies fever or chills  Pulmonary: Denies shortness of breath  Cardiovascular: Denies chest pain or swelling in legs  Gastrointestinal: + abdominal tenderness. Denies nausea, vomiting, constipation, diarrhea  Genitourinary: Denies dysuria, urinary urgency or frequency, hematuria, cloudy or malodorous urine, incontinence, repeat urinary tract infections, flank pain, pelvic pain, vaginal bleeding, vaginal discharge, or vaginal dryness  Integumentary: + incision breakdown. Denies rashes, sores, changing moles, or scarring  Hematologic: Denies bleeding    Physical Exam:    /68  Pulse 90  Temp 98  F (36.7  C) (Oral)  Resp 14   Wt 65.7 kg (144 lb 14.4 oz)  LMP 02/21/2017  SpO2 100%  BMI 24.5 kg/m2    CONSTITUTIONAL: Alert non-toxic appearing female in no acute distress  HEAD: Normocephalic, atraumatic  EYES: PERRLA, no scleral icterus  ENT: Oropharynx pink without lesions   NECK: Neck supple without lymphadenopathy or masses  RESPIRATORY: Lungs clear to auscultation, respiratory effort unlabored  CV: Regular rate and rhythm, S1S2, no clicks, murmurs, rubs, or gallops; bilateral lower extremities without edema  GASTROINTESTINAL: Normoactive bowel sounds x4 quadrants, abdomen soft, non-distended, and non-tender to palpation without masses or organomegaly; vertical midline incision clean and dry with small superficial 1cm dehiscence at inferior incision, no tunneling noted, fascia intact, no erythema, edema, warmth, or drainage  GENITOURINARY: External genitalia and urethral meatus pink without lesions, masses, or excoriation. Vagina pink and moist without masses or lesions. Cervix with scant amount clear discharge from os, no erythema or bleeding. Bimanual exam reveals no masses, fullness, or cervical motion tenderness.  LYMPHATIC: Cervical, supraclavicular, and inguinal lymph nodes without lymphadenopathy  NEUROLOGIC: Grossly intact, normal gait  MUSCULOSKELETAL: Moves all extremities, no obvious muscle wasting  SKIN: Appropriate color for race, warm and dry, no rashes or lesions to unclothed skin  PSYCHIATRIC: Pleasant and interactive, affect bright, makes appropriate eye contact, thought process linear    Assessment/Plan:  1.) Mature teratoma: Doing well post-operatively. Reviewed her pathology report which indicates a benign mature teratoma, discussed fertility as well after RSO. Discussed care of her incision- to keep clean and dry, may use primapore to protect from her waistbands but this will need to heal via secondary intention. No signs of infection. Regarding her TYLER I, she will need to follow up with primary OB-GYN in one year for  co-testing with Pap and HPV testing per ASCCP guidelines. Ibuprofen refilled as she still has mild tenderness at this time. No need for further gyn-onc follow up unless she develops concerns about her incision or increased post-operative pain/complications.  2.) Patient verbalized understanding of and agreement with plan.    A total of 20 minutes face to face time spent with patient, over 50% of which was spent in counseling and coordination of care.    LETICIA Moncada, FNP-C  Division of Gynecologic Oncology  [facility]  Pager: [phone]

## 2017-03-15 NOTE — NURSING NOTE
"Paula Esparza is a 31 year old female who presents for:  Chief Complaint   Patient presents with     Oncology Clinic Visit     Dermoid cyst        Initial Vitals:  /68  Pulse 90  Temp 98  F (36.7  C) (Oral)  Resp 14  Wt 65.7 kg (144 lb 14.4 oz)  LMP 02/21/2017  SpO2 100%  BMI 24.5 kg/m2 Estimated body mass index is 24.5 kg/(m^2) as calculated from the following:    Height as of 3/1/17: 1.638 m (5' 4.49\").    Weight as of this encounter: 65.7 kg (144 lb 14.4 oz).. Body surface area is 1.73 meters squared. BP completed using cuff size: regular  No Pain (0) Patient's last menstrual period was 02/21/2017. Allergies and medications reviewed.     Medications: Medication refills not needed today.  Pharmacy name entered into EPIC: Data Unavailable    Comments:     6 minutes for nursing intake (face to face time)   Adele Steinberg CMA        "

## 2017-03-15 NOTE — LETTER
"3/15/2017       RE: Paula Esparza  [address]  [facility] [address]     Dear Colleague,    Thank you for referring your patient, Paula Esparza, to the [facility]. Please see a copy of my visit note below.    Gynecologic Oncology Follow-Up Visit  RE: Paula Esparza  MRN: [id number]  : 1985  Date of Visit: 03/15/2017    CC: Paula Esparza is a 31 year old  female with a history of a pelvic mass s/p exploratory laparotomy, RSO, TOPEL of abdominal mass, colposcopy, cervical biopsies, ECC, and pelvic washings performed on 3/1/17. She presents today for a post-operative visit.    HPI:  Paula comes to the clinic feeling well. She has had manageable post-operative \"tenderness\" to her incision which she manages with Tylenol and ibuprofen. She is concerned about her incision as she feels her pants have been rubbing on it and caused it to open up. She has been keeping the area clean and denies significant pain, erythema, edema, or drainage. No fever or chills. She has been following post-operative restrictions and pelvic rest.    Oncology History:  3/1/17: Exploratory laparotomy, RSO, TOPEL of abdominal mass, colposcopy, cervical biopsies, ECC, and pelvic washings    FINAL DIAGNOSIS:   A. ENDOCERVIX, CURETTAGE:   - Benign proliferative endometrium, without hyperplasia or atypia   - Superficial strips of benign, non-dysplastic endocervical epithelium     B. CERVIX, COLPOSCOPIC BIOPSY:   - Low grade squamous intraepithelial lesion (cervical intraepithelial   neoplasia 1)   - Transformation zone present     D. 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 RIGHT FALLOPIAN TUBE, PORTION, EXCISION:   - Unremarkable proximal portion of fallopian tube, and fibrovascular   tissue         Past Medical History   Diagnosis Date     " Depression      Not formally diagnosed     Past Surgical History   Procedure Laterality Date     Salpingo oophorectomy,r/l/chapis  3/1/2017           Salpingo-oophorectomy, combined Right 3/1/2017     Procedure: COMBINED SALPINGO-OOPHORECTOMY;  Surgeon: Haley Fernandez MD;  Location:  OR     Family History   Problem Relation Age of Onset     CANCER Mother      Tongue      Coronary Artery Disease Early Onset Father      DIABETES Father      Breast Cancer Maternal Grandmother      Survivor     Coronary Artery Disease Maternal Grandmother      Breast Cancer Paternal Grandmother      Suvivor      Social History     Social History     Marital status: Single     Spouse name: N/A     Number of children: N/A     Years of education: N/A     Social History Main Topics     Smoking status: Current Every Day Smoker     Smokeless tobacco: Never Used     Alcohol use Yes      Comment: socially     Drug use: No     Sexual activity: Not on file     Other Topics Concern     Not on file     Social History Narrative       Current Outpatient Prescriptions   Medication     ibuprofen (ADVIL/MOTRIN) 600 MG tablet     senna-docusate (SENOKOT-S;PERICOLACE) 8.6-50 MG per tablet     ibuprofen (ADVIL/MOTRIN) 600 MG tablet     HYDROcodone-acetaminophen (NORCO) 5-325 MG per tablet     senna-docusate (SB DOCUSATE SODIUM/SENNA) 8.6-50 MG per tablet     imiquimod (ALDARA) 5 % cream     norethindrone-ethinyl estradiol (JUNEL FE 1/20) 1-20 MG-MCG per tablet     No current facility-administered medications for this visit.       No Known Allergies    ROS  General: Denies fever or chills  Pulmonary: Denies shortness of breath  Cardiovascular: Denies chest pain or swelling in legs  Gastrointestinal: + abdominal tenderness. Denies nausea, vomiting, constipation, diarrhea  Genitourinary: Denies dysuria, urinary urgency or frequency, hematuria, cloudy or malodorous urine, incontinence, repeat urinary tract infections, flank pain, pelvic pain, vaginal  bleeding, vaginal discharge, or vaginal dryness  Integumentary: + incision breakdown. Denies rashes, sores, changing moles, or scarring  Hematologic: Denies bleeding    Physical Exam:    /68  Pulse 90  Temp 98  F (36.7  C) (Oral)  Resp 14  Wt 65.7 kg (144 lb 14.4 oz)  LMP 02/21/2017  SpO2 100%  BMI 24.5 kg/m2    CONSTITUTIONAL: Alert non-toxic appearing female in no acute distress  HEAD: Normocephalic, atraumatic  EYES: PERRLA, no scleral icterus  ENT: Oropharynx pink without lesions   NECK: Neck supple without lymphadenopathy or masses  RESPIRATORY: Lungs clear to auscultation, respiratory effort unlabored  CV: Regular rate and rhythm, S1S2, no clicks, murmurs, rubs, or gallops; bilateral lower extremities without edema  GASTROINTESTINAL: Normoactive bowel sounds x4 quadrants, abdomen soft, non-distended, and non-tender to palpation without masses or organomegaly; vertical midline incision clean and dry with small superficial 1cm dehiscence at inferior incision, no tunneling noted, fascia intact, no erythema, edema, warmth, or drainage  GENITOURINARY: External genitalia and urethral meatus pink without lesions, masses, or excoriation. Vagina pink and moist without masses or lesions. Cervix with scant amount clear discharge from os, no erythema or bleeding. Bimanual exam reveals no masses, fullness, or cervical motion tenderness.  LYMPHATIC: Cervical, supraclavicular, and inguinal lymph nodes without lymphadenopathy  NEUROLOGIC: Grossly intact, normal gait  MUSCULOSKELETAL: Moves all extremities, no obvious muscle wasting  SKIN: Appropriate color for race, warm and dry, no rashes or lesions to unclothed skin  PSYCHIATRIC: Pleasant and interactive, affect bright, makes appropriate eye contact, thought process linear    Assessment/Plan:  1.) Mature teratoma: Doing well post-operatively. Reviewed her pathology report which indicates a benign mature teratoma, discussed fertility as well after RSO. Discussed care  of her incision- to keep clean and dry, may use primapore to protect from her waistbands but this will need to heal via secondary intention. No signs of infection. Regarding her TYLER I, she will need to follow up with primary OB-GYN in one year for co-testing with Pap and HPV testing per ASCCP guidelines. Ibuprofen refilled as she still has mild tenderness at this time. No need for further gyn-onc follow up unless she develops concerns about her incision or increased post-operative pain/complications.  2.) Patient verbalized understanding of and agreement with plan.    A total of 20 minutes face to face time spent with patient, over 50% of which was spent in counseling and coordination of care.    LETICIA Moncada, FNP-C  Division of Gynecologic Oncology  [facility]  Pager: [phone]

## 2017-03-15 NOTE — MR AVS SNAPSHOT
"              After Visit Summary   3/15/2017    Paula Esparza    MRN: [id number]           Patient Information     Date Of Birth          1985        Visit Information        Provider Department      3/15/2017 12:40 PM Joy Salazar APRN CNP [facility]        Today's Diagnoses     Postoperative state    -  1    Dermoid cyst        Dysplasia of cervix, low grade (TYLER 1)        Postoperative wound dehiscence, initial encounter           Follow-ups after your visit        Who to contact     If you have questions or need follow up information about today's clinic visit or your schedule please contact [facility] directly at [phone].  Normal or non-critical lab and imaging results will be communicated to you by [employer], letter or phone within 4 business days after the clinic has received the results. If you do not hear from us within 7 days, please contact the clinic through [employer] or phone. If you have a critical or abnormal lab result, we will notify you by phone as soon as possible.  Submit refill requests through [employer] or call your pharmacy and they will forward the refill request to us. Please allow 3 business days for your refill to be completed.          Additional Information About Your Visit        MyChart Information     [employer] lets you send messages to your doctor, view your test results, renew your prescriptions, schedule appointments and more. To sign up, go to [email or web address] . Click on \"Log in\" on the left side of the screen, which will take you to the Welcome page. Then click on \"Sign up Now\" on the right side of the page.     You will be asked to enter the access code listed below, as well as some personal information. Please follow the directions to create your username and password.     Your access code is: [email or web address]  Expires: 2017  2:49 PM     Your access code will  in 90 days. If you need help or a new code, please " call your [place] clinic or [phone].        Care EveryWhere ID     This is your Care EveryWhere ID. This could be used by other organizations to access your [place] medical records  [id number]        Your Vitals Were     Pulse Temperature Respirations Last Period Pulse Oximetry BMI (Body Mass Index)    90 98  F (36.7  C) (Oral) 14 02/21/2017 100% 24.5 kg/m2       Blood Pressure from Last 3 Encounters:   03/15/17 118/68   03/03/17 105/64   02/23/17 122/83    Weight from Last 3 Encounters:   03/15/17 65.7 kg (144 lb 14.4 oz)   03/03/17 62.9 kg (138 lb 9.6 oz)   02/23/17 66.4 kg (146 lb 6.4 oz)              Today, you had the following     No orders found for display         Where to get your medicines      These medications were sent to [address] [address]    Hours:  TRANSPLANT PHONE NUMBER [phone] Phone:  [phone]     ibuprofen 600 MG tablet          Primary Care Provider    Physician No Ref-Primary       No address on file        Thank you!     Thank you for choosing [facility]  for your care. Our goal is always to provide you with excellent care. Hearing back from our patients is one way we can continue to improve our services. Please take a few minutes to complete the written survey that you may receive in the mail after your visit with us. Thank you!             Your Updated Medication List - Protect others around you: Learn how to safely use, store and throw away your medicines at www.disposemymeds.org.          This list is accurate as of: 3/15/17  1:22 PM.  Always use your most recent med list.                   Brand Name Dispense Instructions for use    HYDROcodone-acetaminophen 5-325 MG per tablet    NORCO    25 tablet    Take 1-2 tablets by mouth every 4 hours as needed for moderate to severe pain       ibuprofen 600 MG tablet    ADVIL/MOTRIN    60 tablet     Take 1 tablet (600 mg) by mouth every 6 hours       imiquimod 5 % cream    ALDARA     Apply to affected areas qOD x 6 weeks.       norethindrone-ethinyl estradiol 1-20 MG-MCG per tablet    JUNEL FE 1/20

## 2017-06-15 ENCOUNTER — DOCUMENTATION ONLY (OUTPATIENT)
Dept: OTHER | Facility: CLINIC | Age: 32
End: 2017-06-15

## 2017-06-15 PROBLEM — Z71.89 ACP (ADVANCE CARE PLANNING): Chronic | Status: ACTIVE | Noted: 2017-06-15

## 2018-03-29 NOTE — PROGRESS NOTES
Surgery 3/1/17  Post op visit 3/15/17    Pt feeling fine using ibuprofen for pain   Eating and drinking well  BM daily   Denies fever, urinary issues, leg redness/swelling/tnederness  Incision healing well    Pt will call if issues

## 2022-02-01 NOTE — BRIEF OP NOTE
[facility], [place]    Brief Operative Note    Pre-operative diagnosis:   - Pelvic Mass   - ASC-US pap smear, + high risk HPV    Post-operative diagnosis:   - Dermoid cyst     Procedure:  - Colposcopy   - Cervical biopsies   - Endocervical curettings   - Exploratory laparotomy   - Right salpingo-oophorectomy  - TOPEL of abdominal mass  - Pelvic washing    Surgeon: * Haley Fernandez MD - Primary    Assistants:   - Zhanna Kamara MD, Fellow   - Mar Mederos MD, PGY-3  - Florencia Schulz, MS3    Anesthesia: Combined General with Block     Estimated blood loss: 20 ml  IVF: 2000 ml crystalloid, 1000 ml colloid  UOP: 110 ml    Drains: None  Specimens:   ID Type Source Tests Collected by Time Destination   A : Endo Cervical [place] Biopsy Cervix SURGICAL PATHOLOGY EXAM Haley Fernandez MD 3/1/2017  4:48 PM    B : Cervical Biopsy Biopsy Cervix SURGICAL PATHOLOGY EXAM Haley Fernandez MD 3/1/2017  4:49 PM    C : pelvic washing Washings Pelvis CYTOLOGY NON GYN Haley Fernandez MD 3/1/2017  5:56 PM    D : Right Fallopian  tube and overy Tissue Fallopian Tube and Ovary, Right SURGICAL PATHOLOGY EXAM Haley Fernandez MD 3/1/2017  5:58 PM    E : Portion of right Fallopian tube Tissue Fallopian Tube, Right SURGICAL PATHOLOGY EXAM Haley Fernandez MD 3/1/2017  6:08 PM      Findings: EUA revealed nulliparous cervix without lesions.  Uterus not appreciated secondary to distended abdomen consistent with imaging of large cystic abdominal mass which is palpated to just under the xyphoid process. Colposcopy was not satisfactory.  Minimal AW changes at 6 to 7 o'clock which was biopsied, ECC also obtained. Entry into the abdomen revealed large mass consistent with ovarian etiology.  Upon placement of TOPEL device, thick sebaceous appearing fluid was returned, a total of 5200cc was removed.  Once right salpingo-oophorectomy was completed, normal appearing uterus and left  Pt called into office about a UTI concern she is wondering if it is ok for her to take AZO while taking all the medication she is currently on. Pt would like a call back.      Call back #[phone]   fallopian tube and ovary.  No evidence of nodules on liver, spleen, diaphragm, omentum or bowel.    Complications:None.    Implants: None.    Mar Mederos MD   OB/Gyn Resident, PGY-3  March 1, 2017, 7:20 PM     Haley Fernandez MD    Department of Ob/Gyn and Women's Health  Division of Gynecologic Oncology  [facility]  [phone]    I was scrubbed and present for the entire procedure.

## (undated) DEVICE — SPONGE RAY-TEC 4X8" 7318

## (undated) DEVICE — SU ENDO POLYSORB 0 3" LOOP 21" EL-21-L

## (undated) DEVICE — CATH TRAY FOLEY 16FR W/URINE METER STATLOCK 303316A

## (undated) DEVICE — SU PDS II 0 TP-1 60" Z991G

## (undated) DEVICE — APPLICATOR COTTON TIP 6"X2 STERILE LF 6012

## (undated) DEVICE — DRAPE MAYO STAND 23X54 8337

## (undated) DEVICE — TUBING SUCTION MEDI-VAC SOFT 3/16"X20' N520A

## (undated) DEVICE — BLADE CLIPPER SGL USE 9680

## (undated) DEVICE — SU VICRYL 2-0 TIE 54" J615H

## (undated) DEVICE — SU VICRYL 2-0 CT-1 27" UND J259H

## (undated) DEVICE — PANTIES MESH LG/XLG 2PK 706M2

## (undated) DEVICE — WIPE PREMOIST CLEANSING WASHCLOTHS 7988

## (undated) DEVICE — LINEN TOWEL PACK X6 WHITE 5487

## (undated) DEVICE — SUCTION TIP YANKAUER STR K87

## (undated) DEVICE — SU MONOCRYL 4-0 PS-2 27" UND Y426H

## (undated) DEVICE — DRSG TELFA 3X8" 1238

## (undated) DEVICE — SUCTION SLEEVE NEPTUNE 2 165MM 0703-005-165

## (undated) DEVICE — WIPES FOLEY CARE SURESTEP PROVON DFC100

## (undated) DEVICE — LINEN TOWEL PACK X30 5481

## (undated) DEVICE — ENDO ASPIRATOR TOPEL CYST DISP 5MM 17GA G16402

## (undated) DEVICE — PACK AB HYST II

## (undated) DEVICE — ESU PENCIL W/SMOKE EVAC NEPTUNE STRYKER 0703-046-000

## (undated) DEVICE — SU VICRYL 2-0 CT-2 27" UND J269H

## (undated) DEVICE — SU VICRYL 4-0 PS-2 18" UND J496H

## (undated) DEVICE — ESU PENCIL W/COATED BLADE E2450H

## (undated) DEVICE — PREP SCRUB CARE CHLOROXYLENOL (PCMX) 4OZ 29902-004

## (undated) DEVICE — SOL ADH LIQUID BENZOIN SWAB 0.6ML C1544

## (undated) DEVICE — PAD PERI INDIV WRAP 11" 2022A

## (undated) DEVICE — DRAPE LEGGINGS CLEAR 8430

## (undated) DEVICE — SPECIMEN CONTAINER 5OZ STERILE 2600SA

## (undated) DEVICE — SU VICRYL 2-0 CT-2 27" J333H

## (undated) RX ORDER — ACETAMINOPHEN 325 MG/1
TABLET ORAL
Status: DISPENSED
Start: 2017-03-01

## (undated) RX ORDER — ACETIC ACID 5 %
LIQUID (ML) MISCELLANEOUS
Status: DISPENSED
Start: 2017-03-01

## (undated) RX ORDER — SCOLOPAMINE TRANSDERMAL SYSTEM 1 MG/1
PATCH, EXTENDED RELEASE TRANSDERMAL
Status: DISPENSED
Start: 2017-03-01

## (undated) RX ORDER — CEFAZOLIN SODIUM 2 G/100ML
INJECTION, SOLUTION INTRAVENOUS
Status: DISPENSED
Start: 2017-03-01

## (undated) RX ORDER — HEPARIN SODIUM 5000 [USP'U]/.5ML
INJECTION, SOLUTION INTRAVENOUS; SUBCUTANEOUS
Status: DISPENSED
Start: 2017-03-01

## (undated) RX ORDER — PHENAZOPYRIDINE HYDROCHLORIDE 200 MG/1
TABLET, FILM COATED ORAL
Status: DISPENSED
Start: 2017-03-01

## (undated) RX ORDER — SODIUM CHLORIDE, SODIUM LACTATE, POTASSIUM CHLORIDE, CALCIUM CHLORIDE 600; 310; 30; 20 MG/100ML; MG/100ML; MG/100ML; MG/100ML
INJECTION, SOLUTION INTRAVENOUS
Status: DISPENSED
Start: 2017-03-01

## (undated) RX ORDER — FENTANYL CITRATE 50 UG/ML
INJECTION, SOLUTION INTRAMUSCULAR; INTRAVENOUS
Status: DISPENSED
Start: 2017-03-01

## (undated) RX ORDER — BUPIVACAINE HYDROCHLORIDE AND EPINEPHRINE 5; 5 MG/ML; UG/ML
INJECTION, SOLUTION EPIDURAL; INTRACAUDAL; PERINEURAL
Status: DISPENSED
Start: 2017-03-01